# Patient Record
Sex: MALE | Race: WHITE | NOT HISPANIC OR LATINO | Employment: OTHER | URBAN - METROPOLITAN AREA
[De-identification: names, ages, dates, MRNs, and addresses within clinical notes are randomized per-mention and may not be internally consistent; named-entity substitution may affect disease eponyms.]

---

## 2017-01-12 ENCOUNTER — COMMUNICATION - HEALTHEAST (OUTPATIENT)
Dept: NEUROLOGY | Facility: CLINIC | Age: 67
End: 2017-01-12

## 2017-01-12 DIAGNOSIS — F41.1 ANXIETY STATE: ICD-10-CM

## 2017-02-10 ENCOUNTER — COMMUNICATION - HEALTHEAST (OUTPATIENT)
Dept: NEUROLOGY | Facility: CLINIC | Age: 67
End: 2017-02-10

## 2017-02-10 DIAGNOSIS — F32.A DEPRESSION: ICD-10-CM

## 2017-02-18 ENCOUNTER — AMBULATORY - HEALTHEAST (OUTPATIENT)
Dept: CARDIOLOGY | Facility: CLINIC | Age: 67
End: 2017-02-18

## 2017-02-27 ENCOUNTER — HOSPITAL ENCOUNTER (OUTPATIENT)
Dept: NEUROLOGY | Facility: CLINIC | Age: 67
Setting detail: THERAPIES SERIES
Discharge: STILL ADMITTED OR ADMINISTRATIVE | End: 2017-02-27
Attending: PSYCHIATRY & NEUROLOGY

## 2017-02-27 DIAGNOSIS — G31.83 LEWY BODY DEMENTIA WITHOUT BEHAVIORAL DISTURBANCE (H): ICD-10-CM

## 2017-02-27 DIAGNOSIS — F02.80 LEWY BODY DEMENTIA WITHOUT BEHAVIORAL DISTURBANCE (H): ICD-10-CM

## 2017-03-21 ENCOUNTER — COMMUNICATION - HEALTHEAST (OUTPATIENT)
Dept: NEUROLOGY | Facility: CLINIC | Age: 67
End: 2017-03-21

## 2017-05-15 ENCOUNTER — COMMUNICATION - HEALTHEAST (OUTPATIENT)
Dept: NEUROLOGY | Facility: CLINIC | Age: 67
End: 2017-05-15

## 2017-05-31 ENCOUNTER — COMMUNICATION - HEALTHEAST (OUTPATIENT)
Dept: NEUROLOGY | Facility: CLINIC | Age: 67
End: 2017-05-31

## 2017-05-31 DIAGNOSIS — F32.A DEPRESSION: ICD-10-CM

## 2017-07-10 ENCOUNTER — COMMUNICATION - HEALTHEAST (OUTPATIENT)
Dept: NEUROLOGY | Facility: CLINIC | Age: 67
End: 2017-07-10

## 2017-07-10 DIAGNOSIS — G31.84 MILD COGNITIVE IMPAIRMENT: ICD-10-CM

## 2017-08-03 ENCOUNTER — RECORDS - HEALTHEAST (OUTPATIENT)
Dept: LAB | Facility: CLINIC | Age: 67
End: 2017-08-03

## 2017-08-03 LAB
CHOLEST SERPL-MCNC: 236 MG/DL
FASTING STATUS PATIENT QL REPORTED: ABNORMAL
HDLC SERPL-MCNC: 28 MG/DL
LDLC SERPL CALC-MCNC: 104 MG/DL
LDLC SERPL CALC-MCNC: ABNORMAL MG/DL
TRIGL SERPL-MCNC: 506 MG/DL

## 2017-08-04 ENCOUNTER — COMMUNICATION - HEALTHEAST (OUTPATIENT)
Dept: NEUROLOGY | Facility: CLINIC | Age: 67
End: 2017-08-04

## 2017-08-04 DIAGNOSIS — F32.A DEPRESSION: ICD-10-CM

## 2017-08-07 ENCOUNTER — COMMUNICATION - HEALTHEAST (OUTPATIENT)
Dept: NEUROLOGY | Facility: CLINIC | Age: 67
End: 2017-08-07

## 2017-08-07 DIAGNOSIS — F41.1 ANXIETY STATE: ICD-10-CM

## 2017-08-30 ENCOUNTER — HOSPITAL ENCOUNTER (OUTPATIENT)
Dept: NEUROLOGY | Facility: CLINIC | Age: 67
Setting detail: THERAPIES SERIES
Discharge: STILL ADMITTED OR ADMINISTRATIVE | End: 2017-08-30
Attending: PSYCHIATRY & NEUROLOGY

## 2017-08-30 DIAGNOSIS — F02.80 LEWY BODY DEMENTIA (H): ICD-10-CM

## 2017-08-30 DIAGNOSIS — G31.83 LEWY BODY DEMENTIA (H): ICD-10-CM

## 2017-10-05 ENCOUNTER — HOSPITAL ENCOUNTER (OUTPATIENT)
Dept: OCCUPATIONAL THERAPY | Age: 67
Setting detail: THERAPIES SERIES
Discharge: STILL ADMITTED OR ADMINISTRATIVE | End: 2017-10-05
Attending: PSYCHIATRY & NEUROLOGY

## 2017-10-05 ENCOUNTER — HOSPITAL ENCOUNTER (OUTPATIENT)
Dept: NEUROLOGY | Facility: CLINIC | Age: 67
Setting detail: THERAPIES SERIES
Discharge: STILL ADMITTED OR ADMINISTRATIVE | End: 2017-10-05
Attending: PSYCHIATRY & NEUROLOGY

## 2017-10-05 DIAGNOSIS — G31.83 LEWY BODY DEMENTIA (H): ICD-10-CM

## 2017-10-05 DIAGNOSIS — F02.80 LEWY BODY DEMENTIA (H): ICD-10-CM

## 2017-10-05 DIAGNOSIS — R41.89 COGNITIVE IMPAIRMENT: ICD-10-CM

## 2017-10-09 ENCOUNTER — COMMUNICATION - HEALTHEAST (OUTPATIENT)
Dept: CARDIOLOGY | Facility: CLINIC | Age: 67
End: 2017-10-09

## 2017-11-11 ENCOUNTER — COMMUNICATION - HEALTHEAST (OUTPATIENT)
Dept: CARDIOLOGY | Facility: CLINIC | Age: 67
End: 2017-11-11

## 2017-11-11 DIAGNOSIS — R94.39 ABNORMAL NUCLEAR STRESS TEST: ICD-10-CM

## 2017-11-13 ENCOUNTER — COMMUNICATION - HEALTHEAST (OUTPATIENT)
Dept: CARDIOLOGY | Facility: CLINIC | Age: 67
End: 2017-11-13

## 2017-11-13 DIAGNOSIS — R94.39 ABNORMAL NUCLEAR STRESS TEST: ICD-10-CM

## 2017-12-14 ENCOUNTER — HOSPITAL ENCOUNTER (OUTPATIENT)
Dept: NEUROLOGY | Facility: CLINIC | Age: 67
Setting detail: THERAPIES SERIES
Discharge: STILL ADMITTED OR ADMINISTRATIVE | End: 2017-12-14
Attending: CLINICAL NEUROPSYCHOLOGIST

## 2017-12-14 DIAGNOSIS — G31.83 MILD NEUROCOGNITIVE DISORDER WITH LEWY BODIES (H): ICD-10-CM

## 2017-12-14 DIAGNOSIS — F02.A0 MILD NEUROCOGNITIVE DISORDER WITH LEWY BODIES (H): ICD-10-CM

## 2017-12-19 ENCOUNTER — COMMUNICATION - HEALTHEAST (OUTPATIENT)
Dept: ADMINISTRATIVE | Facility: CLINIC | Age: 67
End: 2017-12-19

## 2018-01-05 ENCOUNTER — HOSPITAL ENCOUNTER (OUTPATIENT)
Dept: NEUROLOGY | Facility: CLINIC | Age: 68
Setting detail: THERAPIES SERIES
Discharge: STILL ADMITTED OR ADMINISTRATIVE | End: 2018-01-05
Attending: PSYCHIATRY & NEUROLOGY

## 2018-01-05 DIAGNOSIS — G31.83 LEWY BODY DEMENTIA WITHOUT BEHAVIORAL DISTURBANCE (H): ICD-10-CM

## 2018-01-05 DIAGNOSIS — F02.80 LEWY BODY DEMENTIA WITHOUT BEHAVIORAL DISTURBANCE (H): ICD-10-CM

## 2018-01-05 DIAGNOSIS — G31.83 LEWY BODY DEMENTIA (H): ICD-10-CM

## 2018-01-05 DIAGNOSIS — F02.80 LEWY BODY DEMENTIA (H): ICD-10-CM

## 2018-01-19 ENCOUNTER — RECORDS - HEALTHEAST (OUTPATIENT)
Dept: LAB | Facility: CLINIC | Age: 68
End: 2018-01-19

## 2018-01-19 LAB
ALBUMIN SERPL-MCNC: 3.7 G/DL (ref 3.5–5)
ALP SERPL-CCNC: 97 U/L (ref 45–120)
ALT SERPL W P-5'-P-CCNC: 21 U/L (ref 0–45)
ANION GAP SERPL CALCULATED.3IONS-SCNC: 9 MMOL/L (ref 5–18)
AST SERPL W P-5'-P-CCNC: 31 U/L (ref 0–40)
BILIRUB SERPL-MCNC: 0.5 MG/DL (ref 0–1)
BUN SERPL-MCNC: 14 MG/DL (ref 8–22)
CALCIUM SERPL-MCNC: 9.4 MG/DL (ref 8.5–10.5)
CHLORIDE BLD-SCNC: 102 MMOL/L (ref 98–107)
CO2 SERPL-SCNC: 30 MMOL/L (ref 22–31)
CREAT SERPL-MCNC: 1.34 MG/DL (ref 0.7–1.3)
GFR SERPL CREATININE-BSD FRML MDRD: 53 ML/MIN/1.73M2
GLUCOSE BLD-MCNC: 107 MG/DL (ref 70–125)
POTASSIUM BLD-SCNC: 4.2 MMOL/L (ref 3.5–5)
PROT SERPL-MCNC: 7.4 G/DL (ref 6–8)
SODIUM SERPL-SCNC: 141 MMOL/L (ref 136–145)

## 2018-01-24 ENCOUNTER — COMMUNICATION - HEALTHEAST (OUTPATIENT)
Dept: NEUROLOGY | Facility: CLINIC | Age: 68
End: 2018-01-24

## 2018-01-24 DIAGNOSIS — G31.83 LEWY BODY DEMENTIA (H): ICD-10-CM

## 2018-01-24 DIAGNOSIS — F02.80 LEWY BODY DEMENTIA (H): ICD-10-CM

## 2018-02-12 ENCOUNTER — COMMUNICATION - HEALTHEAST (OUTPATIENT)
Dept: NEUROLOGY | Facility: CLINIC | Age: 68
End: 2018-02-12

## 2018-02-12 DIAGNOSIS — F32.A DEPRESSION: ICD-10-CM

## 2018-02-21 ENCOUNTER — AMBULATORY - HEALTHEAST (OUTPATIENT)
Dept: CARDIOLOGY | Facility: CLINIC | Age: 68
End: 2018-02-21

## 2018-02-21 ENCOUNTER — RECORDS - HEALTHEAST (OUTPATIENT)
Dept: ADMINISTRATIVE | Facility: OTHER | Age: 68
End: 2018-02-21

## 2018-02-26 ENCOUNTER — OFFICE VISIT - HEALTHEAST (OUTPATIENT)
Dept: CARDIOLOGY | Facility: CLINIC | Age: 68
End: 2018-02-26

## 2018-02-26 DIAGNOSIS — I25.5 ISCHEMIC CARDIOMYOPATHY: ICD-10-CM

## 2018-02-26 DIAGNOSIS — E78.2 MIXED HYPERLIPIDEMIA: ICD-10-CM

## 2018-02-26 DIAGNOSIS — I25.10 CORONARY ARTERY DISEASE INVOLVING NATIVE CORONARY ARTERY OF NATIVE HEART WITHOUT ANGINA PECTORIS: ICD-10-CM

## 2018-02-26 ASSESSMENT — MIFFLIN-ST. JEOR: SCORE: 1940.6

## 2018-03-13 ENCOUNTER — COMMUNICATION - HEALTHEAST (OUTPATIENT)
Dept: NEUROLOGY | Facility: CLINIC | Age: 68
End: 2018-03-13

## 2018-03-13 DIAGNOSIS — F32.A DEPRESSION: ICD-10-CM

## 2018-03-15 ENCOUNTER — COMMUNICATION - HEALTHEAST (OUTPATIENT)
Dept: NEUROLOGY | Facility: CLINIC | Age: 68
End: 2018-03-15

## 2018-03-15 DIAGNOSIS — G31.83 LEWY BODY DEMENTIA (H): ICD-10-CM

## 2018-03-15 DIAGNOSIS — F02.80 LEWY BODY DEMENTIA (H): ICD-10-CM

## 2018-04-03 ENCOUNTER — COMMUNICATION - HEALTHEAST (OUTPATIENT)
Dept: NEUROLOGY | Facility: CLINIC | Age: 68
End: 2018-04-03

## 2018-04-03 DIAGNOSIS — F02.80 LEWY BODY DEMENTIA (H): ICD-10-CM

## 2018-04-03 DIAGNOSIS — G31.83 LEWY BODY DEMENTIA (H): ICD-10-CM

## 2018-04-03 DIAGNOSIS — F29 PSYCHOSIS (H): ICD-10-CM

## 2018-06-15 ENCOUNTER — HOSPITAL ENCOUNTER (OUTPATIENT)
Dept: NEUROLOGY | Facility: CLINIC | Age: 68
Setting detail: THERAPIES SERIES
Discharge: STILL ADMITTED OR ADMINISTRATIVE | End: 2018-06-15
Attending: PSYCHIATRY & NEUROLOGY

## 2018-07-11 ENCOUNTER — RECORDS - HEALTHEAST (OUTPATIENT)
Dept: LAB | Facility: CLINIC | Age: 68
End: 2018-07-11

## 2018-07-11 LAB
ALBUMIN SERPL-MCNC: 3.5 G/DL (ref 3.5–5)
ALP SERPL-CCNC: 96 U/L (ref 45–120)
ALT SERPL W P-5'-P-CCNC: 22 U/L (ref 0–45)
ANION GAP SERPL CALCULATED.3IONS-SCNC: 10 MMOL/L (ref 5–18)
AST SERPL W P-5'-P-CCNC: 23 U/L (ref 0–40)
BILIRUB SERPL-MCNC: 0.5 MG/DL (ref 0–1)
BUN SERPL-MCNC: 13 MG/DL (ref 8–22)
CALCIUM SERPL-MCNC: 8.8 MG/DL (ref 8.5–10.5)
CHLORIDE BLD-SCNC: 105 MMOL/L (ref 98–107)
CO2 SERPL-SCNC: 27 MMOL/L (ref 22–31)
CREAT SERPL-MCNC: 1.12 MG/DL (ref 0.7–1.3)
GFR SERPL CREATININE-BSD FRML MDRD: >60 ML/MIN/1.73M2
GLUCOSE BLD-MCNC: 105 MG/DL (ref 70–125)
LIPASE SERPL-CCNC: 15 U/L (ref 0–52)
POTASSIUM BLD-SCNC: 4 MMOL/L (ref 3.5–5)
PROT SERPL-MCNC: 6.8 G/DL (ref 6–8)
SODIUM SERPL-SCNC: 142 MMOL/L (ref 136–145)

## 2018-08-15 ENCOUNTER — COMMUNICATION - HEALTHEAST (OUTPATIENT)
Dept: NEUROLOGY | Facility: CLINIC | Age: 68
End: 2018-08-15

## 2018-08-15 DIAGNOSIS — F32.A DEPRESSION: ICD-10-CM

## 2018-08-15 DIAGNOSIS — G31.83 LEWY BODY DEMENTIA (H): ICD-10-CM

## 2018-08-15 DIAGNOSIS — F02.80 LEWY BODY DEMENTIA (H): ICD-10-CM

## 2018-08-15 DIAGNOSIS — F29 PSYCHOSIS (H): ICD-10-CM

## 2018-09-25 ENCOUNTER — COMMUNICATION - HEALTHEAST (OUTPATIENT)
Dept: NEUROLOGY | Facility: CLINIC | Age: 68
End: 2018-09-25

## 2018-09-25 DIAGNOSIS — F32.A DEPRESSION: ICD-10-CM

## 2018-10-29 ENCOUNTER — COMMUNICATION - HEALTHEAST (OUTPATIENT)
Dept: NEUROLOGY | Facility: CLINIC | Age: 68
End: 2018-10-29

## 2018-11-02 ENCOUNTER — COMMUNICATION - HEALTHEAST (OUTPATIENT)
Dept: INTERNAL MEDICINE | Facility: CLINIC | Age: 68
End: 2018-11-02

## 2018-11-02 DIAGNOSIS — F03.91 MAJOR NEUROCOGNITIVE DISORDER WITH LEWY BODIES, PROBABLE, WITH BEHAVIORAL DISTURBANCE: ICD-10-CM

## 2018-11-08 ENCOUNTER — RECORDS - HEALTHEAST (OUTPATIENT)
Dept: LAB | Facility: CLINIC | Age: 68
End: 2018-11-08

## 2018-11-08 LAB — INR PPP: 1.91 (ref 0.9–1.1)

## 2018-11-16 ENCOUNTER — HOSPITAL ENCOUNTER (OUTPATIENT)
Dept: NEUROLOGY | Facility: CLINIC | Age: 68
Setting detail: THERAPIES SERIES
Discharge: STILL ADMITTED OR ADMINISTRATIVE | End: 2018-11-16
Attending: PSYCHOLOGIST

## 2018-11-16 DIAGNOSIS — F33.1 MDD (MAJOR DEPRESSIVE DISORDER), RECURRENT EPISODE, MODERATE (H): ICD-10-CM

## 2018-11-23 ENCOUNTER — HOSPITAL ENCOUNTER (OUTPATIENT)
Dept: NEUROLOGY | Facility: CLINIC | Age: 68
Setting detail: THERAPIES SERIES
Discharge: STILL ADMITTED OR ADMINISTRATIVE | End: 2018-11-23
Attending: PSYCHIATRY & NEUROLOGY

## 2018-11-28 ENCOUNTER — COMMUNICATION - HEALTHEAST (OUTPATIENT)
Dept: ADMINISTRATIVE | Facility: CLINIC | Age: 68
End: 2018-11-28

## 2018-12-07 ENCOUNTER — HOSPITAL ENCOUNTER (OUTPATIENT)
Dept: NEUROLOGY | Facility: CLINIC | Age: 68
Setting detail: THERAPIES SERIES
Discharge: STILL ADMITTED OR ADMINISTRATIVE | End: 2018-12-07
Attending: PSYCHOLOGIST

## 2018-12-07 DIAGNOSIS — F33.2 MDD (MAJOR DEPRESSIVE DISORDER), RECURRENT SEVERE, WITHOUT PSYCHOSIS (H): ICD-10-CM

## 2018-12-20 ENCOUNTER — COMMUNICATION - HEALTHEAST (OUTPATIENT)
Dept: NEUROLOGY | Facility: CLINIC | Age: 68
End: 2018-12-20

## 2018-12-20 DIAGNOSIS — F32.1 CURRENT MODERATE EPISODE OF MAJOR DEPRESSIVE DISORDER, UNSPECIFIED WHETHER RECURRENT (H): ICD-10-CM

## 2019-01-08 ENCOUNTER — COMMUNICATION - HEALTHEAST (OUTPATIENT)
Dept: NEUROLOGY | Facility: CLINIC | Age: 69
End: 2019-01-08

## 2019-01-08 DIAGNOSIS — G31.83 LEWY BODY DEMENTIA (H): ICD-10-CM

## 2019-01-08 DIAGNOSIS — F02.80 LEWY BODY DEMENTIA (H): ICD-10-CM

## 2019-01-16 ENCOUNTER — COMMUNICATION - HEALTHEAST (OUTPATIENT)
Dept: NEUROLOGY | Facility: CLINIC | Age: 69
End: 2019-01-16

## 2019-01-16 DIAGNOSIS — F32.0 CURRENT MILD EPISODE OF MAJOR DEPRESSIVE DISORDER WITHOUT PRIOR EPISODE (H): ICD-10-CM

## 2019-02-11 ENCOUNTER — COMMUNICATION - HEALTHEAST (OUTPATIENT)
Dept: NEUROLOGY | Facility: CLINIC | Age: 69
End: 2019-02-11

## 2019-02-11 DIAGNOSIS — F32.A DEPRESSION: ICD-10-CM

## 2019-02-13 ENCOUNTER — RECORDS - HEALTHEAST (OUTPATIENT)
Dept: LAB | Facility: CLINIC | Age: 69
End: 2019-02-13

## 2019-02-13 LAB
ALBUMIN SERPL-MCNC: 3.6 G/DL (ref 3.5–5)
ALP SERPL-CCNC: 111 U/L (ref 45–120)
ALT SERPL W P-5'-P-CCNC: <9 U/L (ref 0–45)
ANION GAP SERPL CALCULATED.3IONS-SCNC: 9 MMOL/L (ref 5–18)
AST SERPL W P-5'-P-CCNC: 19 U/L (ref 0–40)
BILIRUB SERPL-MCNC: 0.4 MG/DL (ref 0–1)
BUN SERPL-MCNC: 11 MG/DL (ref 8–22)
CALCIUM SERPL-MCNC: 8.9 MG/DL (ref 8.5–10.5)
CHLORIDE BLD-SCNC: 105 MMOL/L (ref 98–107)
CHOLEST SERPL-MCNC: 261 MG/DL
CO2 SERPL-SCNC: 27 MMOL/L (ref 22–31)
CREAT SERPL-MCNC: 1.1 MG/DL (ref 0.7–1.3)
FASTING STATUS PATIENT QL REPORTED: ABNORMAL
GFR SERPL CREATININE-BSD FRML MDRD: >60 ML/MIN/1.73M2
GLUCOSE BLD-MCNC: 102 MG/DL (ref 70–125)
HDLC SERPL-MCNC: 33 MG/DL
LDLC SERPL CALC-MCNC: 167 MG/DL
POTASSIUM BLD-SCNC: 4.4 MMOL/L (ref 3.5–5)
PROT SERPL-MCNC: 6.9 G/DL (ref 6–8)
PSA SERPL-MCNC: 0.3 NG/ML (ref 0–4.5)
SODIUM SERPL-SCNC: 141 MMOL/L (ref 136–145)
TRIGL SERPL-MCNC: 304 MG/DL

## 2019-03-07 ENCOUNTER — COMMUNICATION - HEALTHEAST (OUTPATIENT)
Dept: NEUROLOGY | Facility: CLINIC | Age: 69
End: 2019-03-07

## 2019-04-23 ENCOUNTER — OFFICE VISIT - HEALTHEAST (OUTPATIENT)
Dept: CARDIOLOGY | Facility: CLINIC | Age: 69
End: 2019-04-23

## 2019-04-23 DIAGNOSIS — E78.2 MIXED HYPERLIPIDEMIA: ICD-10-CM

## 2019-04-23 DIAGNOSIS — I25.5 ISCHEMIC CARDIOMYOPATHY: ICD-10-CM

## 2019-04-23 DIAGNOSIS — I25.10 CORONARY ARTERY DISEASE INVOLVING NATIVE CORONARY ARTERY OF NATIVE HEART WITHOUT ANGINA PECTORIS: ICD-10-CM

## 2019-04-23 ASSESSMENT — MIFFLIN-ST. JEOR: SCORE: 1926.99

## 2019-05-06 ENCOUNTER — COMMUNICATION - HEALTHEAST (OUTPATIENT)
Dept: NEUROLOGY | Facility: CLINIC | Age: 69
End: 2019-05-06

## 2019-06-12 ENCOUNTER — HOSPITAL ENCOUNTER (OUTPATIENT)
Dept: NEUROLOGY | Facility: CLINIC | Age: 69
Setting detail: THERAPIES SERIES
Discharge: STILL ADMITTED OR ADMINISTRATIVE | End: 2019-06-12
Attending: PSYCHIATRY & NEUROLOGY

## 2019-06-12 DIAGNOSIS — G31.83 LEWY BODY DEMENTIA WITHOUT BEHAVIORAL DISTURBANCE (H): ICD-10-CM

## 2019-06-12 DIAGNOSIS — F02.80 LEWY BODY DEMENTIA WITHOUT BEHAVIORAL DISTURBANCE (H): ICD-10-CM

## 2019-07-01 ENCOUNTER — COMMUNICATION - HEALTHEAST (OUTPATIENT)
Dept: NEUROLOGY | Facility: CLINIC | Age: 69
End: 2019-07-01

## 2019-07-01 DIAGNOSIS — F29 PSYCHOSIS (H): ICD-10-CM

## 2019-07-30 ENCOUNTER — RECORDS - HEALTHEAST (OUTPATIENT)
Dept: LAB | Facility: CLINIC | Age: 69
End: 2019-07-30

## 2019-07-31 LAB — BACTERIA SPEC CULT: NO GROWTH

## 2019-08-19 ENCOUNTER — COMMUNICATION - HEALTHEAST (OUTPATIENT)
Dept: NEUROLOGY | Facility: CLINIC | Age: 69
End: 2019-08-19

## 2019-08-19 DIAGNOSIS — F32.A DEPRESSION: ICD-10-CM

## 2019-08-19 DIAGNOSIS — F02.80 LEWY BODY DEMENTIA (H): ICD-10-CM

## 2019-08-19 DIAGNOSIS — G31.83 LEWY BODY DEMENTIA (H): ICD-10-CM

## 2019-09-03 ENCOUNTER — COMMUNICATION - HEALTHEAST (OUTPATIENT)
Dept: NEUROLOGY | Facility: CLINIC | Age: 69
End: 2019-09-03

## 2019-09-03 DIAGNOSIS — F32.A DEPRESSION: ICD-10-CM

## 2019-09-04 ENCOUNTER — COMMUNICATION - HEALTHEAST (OUTPATIENT)
Dept: NEUROLOGY | Facility: CLINIC | Age: 69
End: 2019-09-04

## 2019-09-04 DIAGNOSIS — F32.A DEPRESSION: ICD-10-CM

## 2019-11-06 ENCOUNTER — HOSPITAL ENCOUNTER (OUTPATIENT)
Dept: NEUROLOGY | Facility: CLINIC | Age: 69
Setting detail: THERAPIES SERIES
Discharge: STILL ADMITTED OR ADMINISTRATIVE | End: 2019-11-06
Attending: PSYCHIATRY & NEUROLOGY

## 2019-11-06 DIAGNOSIS — F02.80 LEWY BODY DEMENTIA WITHOUT BEHAVIORAL DISTURBANCE (H): ICD-10-CM

## 2019-11-06 DIAGNOSIS — G31.83 LEWY BODY DEMENTIA WITHOUT BEHAVIORAL DISTURBANCE (H): ICD-10-CM

## 2019-11-25 ENCOUNTER — COMMUNICATION - HEALTHEAST (OUTPATIENT)
Dept: NEUROLOGY | Facility: CLINIC | Age: 69
End: 2019-11-25

## 2019-11-25 DIAGNOSIS — F32.A DEPRESSION: ICD-10-CM

## 2020-01-23 ENCOUNTER — RECORDS - HEALTHEAST (OUTPATIENT)
Dept: LAB | Facility: CLINIC | Age: 70
End: 2020-01-23

## 2020-01-23 LAB
ALBUMIN SERPL-MCNC: 3.6 G/DL (ref 3.5–5)
ALP SERPL-CCNC: 93 U/L (ref 45–120)
ALT SERPL W P-5'-P-CCNC: 12 U/L (ref 0–45)
ANION GAP SERPL CALCULATED.3IONS-SCNC: 8 MMOL/L (ref 5–18)
AST SERPL W P-5'-P-CCNC: 20 U/L (ref 0–40)
BILIRUB SERPL-MCNC: 0.4 MG/DL (ref 0–1)
BUN SERPL-MCNC: 14 MG/DL (ref 8–22)
CALCIUM SERPL-MCNC: 8.9 MG/DL (ref 8.5–10.5)
CHLORIDE BLD-SCNC: 103 MMOL/L (ref 98–107)
CHOLEST SERPL-MCNC: 262 MG/DL
CO2 SERPL-SCNC: 30 MMOL/L (ref 22–31)
CREAT SERPL-MCNC: 1 MG/DL (ref 0.7–1.3)
FASTING STATUS PATIENT QL REPORTED: ABNORMAL
GFR SERPL CREATININE-BSD FRML MDRD: >60 ML/MIN/1.73M2
GLUCOSE BLD-MCNC: 108 MG/DL (ref 70–125)
HDLC SERPL-MCNC: 33 MG/DL
LDLC SERPL CALC-MCNC: 118 MG/DL
LDLC SERPL CALC-MCNC: ABNORMAL MG/DL
POTASSIUM BLD-SCNC: 3.8 MMOL/L (ref 3.5–5)
PROT SERPL-MCNC: 6.6 G/DL (ref 6–8)
SODIUM SERPL-SCNC: 141 MMOL/L (ref 136–145)
TRIGL SERPL-MCNC: 510 MG/DL

## 2020-03-26 ENCOUNTER — COMMUNICATION - HEALTHEAST (OUTPATIENT)
Dept: CARDIOLOGY | Facility: CLINIC | Age: 70
End: 2020-03-26

## 2020-03-27 ENCOUNTER — AMBULATORY - HEALTHEAST (OUTPATIENT)
Dept: CARDIOLOGY | Facility: CLINIC | Age: 70
End: 2020-03-27

## 2020-03-27 ENCOUNTER — RECORDS - HEALTHEAST (OUTPATIENT)
Dept: ADMINISTRATIVE | Facility: OTHER | Age: 70
End: 2020-03-27

## 2020-03-31 ENCOUNTER — RECORDS - HEALTHEAST (OUTPATIENT)
Dept: ADMINISTRATIVE | Facility: OTHER | Age: 70
End: 2020-03-31

## 2020-03-31 ENCOUNTER — AMBULATORY - HEALTHEAST (OUTPATIENT)
Dept: CARDIOLOGY | Facility: CLINIC | Age: 70
End: 2020-03-31

## 2020-04-01 ENCOUNTER — COMMUNICATION - HEALTHEAST (OUTPATIENT)
Dept: CARDIOLOGY | Facility: CLINIC | Age: 70
End: 2020-04-01

## 2020-04-01 ENCOUNTER — OFFICE VISIT - HEALTHEAST (OUTPATIENT)
Dept: CARDIOLOGY | Facility: CLINIC | Age: 70
End: 2020-04-01

## 2020-04-01 DIAGNOSIS — I49.3 PVC'S (PREMATURE VENTRICULAR CONTRACTIONS): ICD-10-CM

## 2020-04-01 DIAGNOSIS — I50.23 ACUTE ON CHRONIC SYSTOLIC HEART FAILURE (H): ICD-10-CM

## 2020-04-03 ENCOUNTER — HOSPITAL ENCOUNTER (OUTPATIENT)
Dept: CARDIOLOGY | Facility: HOSPITAL | Age: 70
Discharge: HOME | End: 2020-04-03
Attending: INTERNAL MEDICINE

## 2020-04-03 LAB
AORTIC VALVE MEAN VELOCITY: 84.5 CM/S
ASCENDING AORTA: 4.2 CM
AV DIMENSIONLESS INDEX VTI: 0.7
AV MEAN GRADIENT: 3 MMHG
AV PEAK GRADIENT: 4.8 MMHG
AV VALVE AREA: 2.5 CM2
AV VELOCITY RATIO: 0.8
BSA FOR ECHO PROCEDURE: 2.38 M2
CV ECHO HEIGHT: 74 IN
CV ECHO WEIGHT: 240 LBS
DOP CALC AO PEAK VEL: 110 CM/S
DOP CALC AO VTI: 21.8 CM
DOP CALC LVOT AREA: 3.46 CM2
DOP CALC LVOT DIAMETER: 2.1 CM
DOP CALC LVOT PEAK VEL: 82.5 CM/S
DOP CALC LVOT STROKE VOLUME: 55.4 CM3
DOP CALCLVOT PEAK VEL VTI: 16 CM
EJECTION FRACTION: 46 % (ref 55–75)
LEFT ATRIUM SIZE: 4 CM
LEFT VENTRICLE DIASTOLIC VOLUME INDEX: 67.2 CM3/M2 (ref 34–74)
LEFT VENTRICLE DIASTOLIC VOLUME: 160 CM3 (ref 62–150)
LEFT VENTRICLE SYSTOLIC VOLUME INDEX: 36.1 CM3/M2 (ref 11–31)
LEFT VENTRICLE SYSTOLIC VOLUME: 86 CM3 (ref 21–61)
LEFT VENTRICULAR OUTFLOW TRACT MEAN GRADIENT: 2 MMHG
LEFT VENTRICULAR OUTFLOW TRACT MEAN VELOCITY: 64.7 CM/S
LEFT VENTRICULAR OUTFLOW TRACT PEAK GRADIENT: 3 MMHG
LV STROKE VOLUME INDEX: 23.3 ML/M2
MITRAL VALVE E/A RATIO: 0.6
MV AVERAGE E/E' RATIO: 7.5 CM/S
MV DECELERATION TIME: 204 MS
MV E'TISSUE VEL-LAT: 7.03 CM/S
MV E'TISSUE VEL-MED: 7.88 CM/S
MV LATERAL E/E' RATIO: 8
MV MEDIAL E/E' RATIO: 7.1
MV PEAK A VELOCITY: 87.2 CM/S
MV PEAK E VELOCITY: 56.1 CM/S
NUC REST DIASTOLIC VOLUME INDEX: 3840 LBS
NUC REST SYSTOLIC VOLUME INDEX: 74 IN

## 2020-04-03 ASSESSMENT — MIFFLIN-ST. JEOR: SCORE: 1913.38

## 2020-04-08 ENCOUNTER — COMMUNICATION - HEALTHEAST (OUTPATIENT)
Dept: CARDIOLOGY | Facility: CLINIC | Age: 70
End: 2020-04-08

## 2020-04-08 ENCOUNTER — OFFICE VISIT - HEALTHEAST (OUTPATIENT)
Dept: CARDIOLOGY | Facility: CLINIC | Age: 70
End: 2020-04-08

## 2020-04-08 DIAGNOSIS — I49.3 PVC'S (PREMATURE VENTRICULAR CONTRACTIONS): ICD-10-CM

## 2020-04-08 DIAGNOSIS — I50.23 ACUTE ON CHRONIC SYSTOLIC HEART FAILURE (H): ICD-10-CM

## 2020-04-10 ENCOUNTER — COMMUNICATION - HEALTHEAST (OUTPATIENT)
Dept: CARDIOLOGY | Facility: CLINIC | Age: 70
End: 2020-04-10

## 2020-04-13 ENCOUNTER — COMMUNICATION - HEALTHEAST (OUTPATIENT)
Dept: CARDIOLOGY | Facility: CLINIC | Age: 70
End: 2020-04-13

## 2020-04-16 ENCOUNTER — OFFICE VISIT - HEALTHEAST (OUTPATIENT)
Dept: CARDIOLOGY | Facility: CLINIC | Age: 70
End: 2020-04-16

## 2020-04-16 DIAGNOSIS — I10 ESSENTIAL HYPERTENSION: ICD-10-CM

## 2020-04-16 DIAGNOSIS — I50.23 ACUTE ON CHRONIC SYSTOLIC HEART FAILURE (H): ICD-10-CM

## 2020-04-16 DIAGNOSIS — I25.5 ISCHEMIC CARDIOMYOPATHY: ICD-10-CM

## 2020-04-16 LAB
ANION GAP SERPL CALCULATED.3IONS-SCNC: 10 MMOL/L (ref 5–18)
BUN SERPL-MCNC: 11 MG/DL (ref 8–28)
CALCIUM SERPL-MCNC: 8.7 MG/DL (ref 8.5–10.5)
CHLORIDE BLD-SCNC: 99 MMOL/L (ref 98–107)
CO2 SERPL-SCNC: 29 MMOL/L (ref 22–31)
CREAT SERPL-MCNC: 1.04 MG/DL (ref 0.7–1.3)
GFR SERPL CREATININE-BSD FRML MDRD: >60 ML/MIN/1.73M2
GLUCOSE BLD-MCNC: 105 MG/DL (ref 70–125)
POTASSIUM BLD-SCNC: 4.2 MMOL/L (ref 3.5–5)
SODIUM SERPL-SCNC: 138 MMOL/L (ref 136–145)

## 2020-04-16 ASSESSMENT — MIFFLIN-ST. JEOR: SCORE: 1949.67

## 2020-04-17 LAB — BNP SERPL-MCNC: 10 PG/ML (ref 0–67)

## 2020-04-23 ENCOUNTER — OFFICE VISIT - HEALTHEAST (OUTPATIENT)
Dept: CARDIOLOGY | Facility: CLINIC | Age: 70
End: 2020-04-23

## 2020-04-23 DIAGNOSIS — I25.5 ISCHEMIC CARDIOMYOPATHY: ICD-10-CM

## 2020-04-23 DIAGNOSIS — I25.10 CORONARY ARTERY DISEASE INVOLVING NATIVE CORONARY ARTERY OF NATIVE HEART WITHOUT ANGINA PECTORIS: ICD-10-CM

## 2020-04-23 DIAGNOSIS — I50.23 ACUTE ON CHRONIC SYSTOLIC HEART FAILURE (H): ICD-10-CM

## 2020-04-23 DIAGNOSIS — I50.22 CHRONIC SYSTOLIC HEART FAILURE (H): ICD-10-CM

## 2020-07-19 ENCOUNTER — COMMUNICATION - HEALTHEAST (OUTPATIENT)
Dept: CARDIOLOGY | Facility: CLINIC | Age: 70
End: 2020-07-19

## 2020-07-19 DIAGNOSIS — I50.23 ACUTE ON CHRONIC SYSTOLIC HEART FAILURE (H): ICD-10-CM

## 2020-08-25 ENCOUNTER — VIRTUAL VISIT (OUTPATIENT)
Dept: PHARMACY | Facility: PHYSICIAN GROUP | Age: 70
End: 2020-08-25
Payer: COMMERCIAL

## 2020-08-25 DIAGNOSIS — G89.29 OTHER CHRONIC PAIN: ICD-10-CM

## 2020-08-25 DIAGNOSIS — F02.818 LEWY BODY DEMENTIA WITH BEHAVIORAL DISTURBANCE (H): ICD-10-CM

## 2020-08-25 DIAGNOSIS — I10 BENIGN ESSENTIAL HYPERTENSION: ICD-10-CM

## 2020-08-25 DIAGNOSIS — Z78.9 TAKES DIETARY SUPPLEMENTS: ICD-10-CM

## 2020-08-25 DIAGNOSIS — G62.9 PERIPHERAL POLYNEUROPATHY: ICD-10-CM

## 2020-08-25 DIAGNOSIS — M62.838 MUSCLE SPASM: ICD-10-CM

## 2020-08-25 DIAGNOSIS — F41.9 ANXIETY: ICD-10-CM

## 2020-08-25 DIAGNOSIS — Z86.711 HISTORY OF PULMONARY EMBOLISM: Primary | ICD-10-CM

## 2020-08-25 DIAGNOSIS — F32.A DEPRESSION, UNSPECIFIED DEPRESSION TYPE: ICD-10-CM

## 2020-08-25 DIAGNOSIS — G47.00 INSOMNIA, UNSPECIFIED TYPE: ICD-10-CM

## 2020-08-25 DIAGNOSIS — N40.0 BENIGN PROSTATIC HYPERPLASIA, UNSPECIFIED WHETHER LOWER URINARY TRACT SYMPTOMS PRESENT: ICD-10-CM

## 2020-08-25 DIAGNOSIS — R23.8 SKIN IRRITATION: ICD-10-CM

## 2020-08-25 DIAGNOSIS — K21.9 GASTROESOPHAGEAL REFLUX DISEASE WITHOUT ESOPHAGITIS: ICD-10-CM

## 2020-08-25 DIAGNOSIS — R11.0 NAUSEA: ICD-10-CM

## 2020-08-25 DIAGNOSIS — G50.0 TRIGEMINAL NEURALGIA: ICD-10-CM

## 2020-08-25 DIAGNOSIS — G31.83 LEWY BODY DEMENTIA WITH BEHAVIORAL DISTURBANCE (H): ICD-10-CM

## 2020-08-25 PROCEDURE — 99605 MTMS BY PHARM NP 15 MIN: CPT | Mod: TEL | Performed by: PHARMACIST

## 2020-08-25 PROCEDURE — 99607 MTMS BY PHARM ADDL 15 MIN: CPT | Mod: TEL | Performed by: PHARMACIST

## 2020-08-25 RX ORDER — QUETIAPINE FUMARATE 25 MG/1
50 TABLET, FILM COATED ORAL AT BEDTIME
COMMUNITY

## 2020-08-25 RX ORDER — WARFARIN SODIUM 4 MG/1
4 TABLET ORAL DAILY
COMMUNITY

## 2020-08-25 RX ORDER — TAMSULOSIN HYDROCHLORIDE 0.4 MG/1
0.8 CAPSULE ORAL DAILY
COMMUNITY

## 2020-08-25 RX ORDER — FUROSEMIDE 20 MG
40 TABLET ORAL DAILY
COMMUNITY

## 2020-08-25 RX ORDER — ONDANSETRON 8 MG/1
8 TABLET, ORALLY DISINTEGRATING ORAL EVERY 8 HOURS PRN
COMMUNITY

## 2020-08-25 RX ORDER — FINASTERIDE 5 MG/1
5 TABLET, FILM COATED ORAL EVERY MORNING
COMMUNITY

## 2020-08-25 RX ORDER — RIVASTIGMINE 9.5 MG/24H
1 PATCH, EXTENDED RELEASE TRANSDERMAL DAILY
COMMUNITY
End: 2021-04-07

## 2020-08-25 RX ORDER — ESCITALOPRAM OXALATE 20 MG/1
20 TABLET ORAL DAILY
COMMUNITY

## 2020-08-25 NOTE — PROGRESS NOTES
[facility] ENCOUNTER  SUBJECTIVE/OBJECTIVE:                           Cain Charles is a 70 year old male called for an initial visit.  He was referred to me from Dr. Sharp.     Patient consented to a telehealth visit: yes  Telemedicine Visit Details  Type of service:  Telephone visit  Start Time: 10:01 AM  End Time: 11:07 AM  Originating Location (pt. Location): Home  Distant Location (provider location):  [facility]  Mode of Communication:  Telephone    Chief Complaint: Initial medication review.  Referral for polypharmacy, with the goal to reduce medications. He is wondering if he is taking his medications are the right time of day.  He feels like he is taking a lot of medications, and thinks one of his AM medications is causing some GI upset.  Patients main concerns are the hot flashes, anxiety, and uncontrolled pain.    Personal Healthcare Goals: Patient would like to improve some of the medication side effects he is having, and to reduce medications.      Allergies/ADRs: Compazine - anxiety attack, sulfamethizole, galantamine - tremors, atorvastatin - muscle cramps, simvastatin - muscle cramps, Lyrica - akinesia, increased anxiety, trazodone - nightmares, Cipro   Tobacco:  reports that he has never smoked. He does not have any smokeless tobacco history on file.  Alcohol: not currently using  Caffeine: no caffeine  PMH: Reviewed in EHR    Medication Adherence/Access:  He is having problem with his pharmacy and filling his medications.  The Tizanidine the bottle says 1-2 tablets every 8 hours as needed (60 pills).  Pt is using 4-6 per day, and says it isn't lasting him a full 30 days.  Pt would like the quantity for the prescription to be increased.    Hiw wife sets up his medications for him.  He takes is AM medications around 10 am, usually sleeps in a bit.  PM medications are usually around 3-4 PM, and then his bedtime is around midnight.  Takes HS medications around 11:30 or 11:00  PM    Hx PE/HTN/ Hx anterior MI/ Stenting of LAD:   Patient is currently taking warfarin 4 mg daily  aspirin 81mg for anticoagulation and antiplatelet therapy. Patient reports no current concerns of bruising or bleeding. Patient does not have a hx of GI bleed.   Patient is also taking Losartan 100 mg daily, furosemide 40 mg daily, and Atenolol 50 mg daily. Pt reports no current medication side effects. Patient does not self-monitor BP.  [facility]    INR:  7/29 - 2.7  6/30 - 2.3  5/29 - 1.7  5/1 - 1/8 4/1/2020:  LVEF 45%    ECHO(3/6/17)  QT/QTc - 400/408    Lewy Body Dementia:  Patient currently taking Exelon 9.5 mg patch once daily.  Over the years his memory has gotton worse.  He notes that his short term memory is poor.  Patient having horrific nightmares at night.  These started happening in the last month or two.  He usually sleeps for 4 hours and then wakes up due to the dreams.  This is occurring every night.    Chronic pain/Trigeminal Neuralgia/Peripheral Neuropathy/Muscle spasms:    Current medications include: Hydrocodone-APAP 7.5 mg 1.5 tablets three times daily, Gabapentin 600 mg AM/1200 mg PM, Tizanidine 4-8 mg three times daily (taking scheduled).     He has been slowly reducing down his dose of his hydrocodone over the last couples years and his gabapentin dose.  He has been in a lot of pain lately, mostly in his back and legs.  It has been 3-4 months ago since his last dose decrease.  His muscle spasms at night have gotten terrible, and occur mostly at night.  The nerve pain in his feet at night are terrible as well.  He is noticing more nerve pain in his feet.  Sometimes it is so intense he cannot sleep.  Has been on cyclobenazprine and it wasn't effective.     Patient is taking medical marijuana.  He is thinking of backing off on it due to the cost.  Pt denies constipation, if anything has diarrhea.  Has a lot of anxiety which he thinks affects his  bowels.  He gets enormous hot flashes.  Does get some sedation/dizzines.     Patient reports the following side effects:  Confusion, Depression, Lethargy, Memory Loss (also has lewy body dementia), Nausea, Sedation, severe sedation/fatigue, hot flashes, balance issues, and falls.  Hot flashes/sweating which started about 3-4 months ago.  Neuropathy also contributes to balance issues and falls.  MME:  33.75    Nausea:  Currently taking Ondansetron 8 mg ODT three times PRN.  Pt denies side effects and it is effective when needed.  The nausea is hard to nail down what causes it, doesn't notice it at specific times.  May go days without the nausea, and then it is there for a few days.      Depression/Anxiety/Insomnia:    Current medications include: Escitalopram 20 mg once daily, Mirtazapine 30 mg daily, Quetiapine 25 mg three times daily/ 50 mg HS, and Alprazolam 1 mg three times daily (takes two tabs every day and 3 tabs a few days per week).    Pt is also having horrific nightmares, these dreams wake him up at night.  These started happening again in the last month or two.  Usually will sleep for 4 hours, and then wakes up due to dreams.  Pt thinks he has been feeling down and more anxious lately.  He does have a lot of anxiety.  Pt has tried melatonin and it isn't effective, not using it. He also has never used the hydroxyzine on his medication list.   Patient was started on quetiapine by his previous psychiatrist Dr. Earl.  It was started after patient's good friend committed suicide, and the pt helped clean up the persons home which was very distressing for him to see.  He was started on quetiapine and it was helpful for anxiety.  Hx of previous medications:  - Venlafaxine:  Stopped due to diarrhea, worsening in muscle cramps  - Fluoxetine:  Pt's response is unclear.  - Bupropion:  Pt's response is unclear.  - Paroxetine: Pt's response is unclear.    GERD:   Current medications include: Protonix (pantoprazole) 40  mg once daily. Pt c/o no current symptoms.  Patient feels that current regimen is effective.    BPH:   Currently taking Finasteride 5 mg daily, Tamsulosin 0.8 mg daily.  He does sometimes have issues emptying bladder, but has improved a lot since starting these medications.  Going to the bathroom about 2 times at night.  He was getting up at night a lot more often.  He is up every 20 minutes or so after taking BPH medications during the day.      Skin irritation:  Patient uses betamethasone--clotrimazole 1-0.05 cream BID PRN, and Nystatin-triamcinolone ointment three times daily PRN.  Both topicals are effective when needed.    Supplements/OTCS:  Patient currently taking vitamin D 2000 units daily,     Today's Vitals: There were no vitals taken for this visit.  - telephone encounter.    ASSESSMENT:                              Medication Adherence: good, no issues identified    Significant Drug interactions:  Tizanidine w/ Atenolol, Furosemide, Losartan, Nitrostat, Tamsulosin:  Increased risk of hypotension, orthostasis, and falls.  Alprazolam/ Hydrocodone/ Tizanidine/ Gabapentin/ Quetiapine:  Increased risk of CNS depression.  Escitalopram/ Tizanidine/ Quetiapine:  Increased risk of QT prolongation.  Last QT within normal limits, and no major dose changes since then.    Hx PE/HTN/ Hx anterior MI/ Stenting of LAD:    Stable. Patient is meeting BP goal of < 140/90mmHg.     Lewy Body Dementia:  Stable.  Patients hot flashes and nightmares could be somatic symptoms of Lewy body dementia.  They could also be medication side effects of escitalopram, hydrocodone, Exelon patch.    Chronic pain/Trigeminal Neuralgia/Peripheral Neuropathy/Muscle spasms:    Needs improvement.  Patient is still having a lot of neuropathy and muscle spasms.  Tizinidine can cause hypotension/dizziness, and sedation. Patient's sedation is likely a combination of medication side effects from hydrocodone, tizanidine, quetiapine, gabapentin, and  alprazolam.  He has greatly reduced doses of his pain meds.  May be beneficial to try switching to a less sedating muscle relaxer like Methocarbamol.  Pt taking close to the max dose of tizanidine every day, so would suggest switching to methocarbamol 750 mg three times daily to start with.  Patient is at a higher risk of CNS depression, respiratory failure, and overdose with concomitant use of hydrocodone (33.75 MME), alprazolam, tizanidine, and gabapentin.  Given short term memory issues, there could be concern for medication mistakes and accidental overdoses.  Patient may benefit from having Narcan available as safety precaution.      Nausea:  Stable.  Nausea could be a side effect of his hydrocodone, but also sounds like it is linked to anxiety. Patient's GI upset in the morning could be caused by several of his medications.  Most likely would be vitamin D and aspirin, which he could try taking at bedtime to see if it's less noticeable.    Depression/Anxiety/Insomnia:    Needs improvement.  Patient is having significant anxiety, depression and issues sleeping.  Much of his insomnia is from the vivid dreams and muscle cramps waking him up.  His pain is also not well controlled and interferes with sleep.  Patient would likely benefit from switching from escitalopram to an SNRI like duloxetine, venlafaxine, or Fetzima.  An SNRI may help with his chronic pain and neuropathy in his feet.  Would suggest trying Fetzima first if it's covered because it more potently increases norepinephrine vs serotonin (3:1 ratio).  Patient vivid dreams could be a side effect of several medications, but also could be related to douglas body dementia.  Possible meds: mirtazapine, alprazolam, quetiapine, escitalopram.  Alprazolam is a high risk medication, especially given this patients history of falls, memory issues, and dementia. Would likely be beneficial for him to slowly taper down the dose over a period of a 1-3 years.  Could reduce  dose by 0.25 mg every 2-3 months as tolerated.  Would be best to first focus on his main concerns of pain and the hot flashes before changing.  Would not recommend the patient use hydroxyzine due to risk of exacerbating dementia, falls, balance issues, memory problems, and confusion.    GERD:   Stable.    BPH:   Stable. His tamsulosin could be contributing to dizziness and orthostasis.  We could consider switching to alfuzosin which is much more selective for alpha-1 receptor vs tamsulosin, and is less likely to cause dizziness.    Skin irritation:  Stable.    Supplements/OTCS:  Stable.    PLAN:                            1. Consider stopping tizanidine and switching to methocarbamol 750 mg three times daily.    2. Consider switching Vitamin D and Aspirin at night to see if the upset stomach improves.  If no improvement after 2 weeks switch Vitamin D back to morning.      3.  Recommend consider tapering off escitalopram, and switching to Fetzima.    I spent 60 minutes with this patient today. I offer these suggestions for consideration by Dr. Sharp. A copy of the visit note was provided to the patient's primary care provider.    Will follow up in 1 week.    The patient was mailed a summary of these recommendations.     Dena Hewitt, PharmD  Medication Therapy Management Pharmacist  Pager: [phone]

## 2020-08-25 NOTE — PROGRESS NOTES
MTM ENCOUNTER  SUBJECTIVE/OBJECTIVE:                           Cain Charles is a 70 year old male called for an initial visit. He was referred to me from Dr. Sharp.    Medications   Name strength formulation, Sig: take route frequency   Taking ATENOLOL 50 mg tablet, Si tab(s) orally once a day    Taking Lorcet Plus 325 mg-7.5 mg tablet, Si tab(s) orally every 4 - 6 hours, maximum 5 in 24 hours Start Date: 2019    Taking Vitamin D3 2000 iu , Si tab once a day    Taking Tizanidine Hydrochloride 4 mg Tablet, Si-2 tab(s) orally every 8 hours as needed for muslce spasm    Taking tamsulosin 0.4 mg Capsule, Si CAPSULES ONCE A DAY AT BEDTIME ORALLY     Refill Acetaminophen-Hydrocodone Bitartrate 325 mg-7.5 mg tablet, Si and 1/2 tabs orally 3 times daily Start Date: 2020 Stop Date: 2020    Taking Seroquel 50 mg tablet, Si tab(s) orally once daily at bedtime    Taking Remeron 30 mg tablet, Si tab(s) orally once a day (at bedtime)    Taking pantoprazole 40 mg Delayed Release Tablet, Si tab(s) orally once a day    Taking Ondansetron Hydrochloride 8 mg tablet, disintegrating, Si tab(s) orally 3 times a day as needed for nausea Start Date: 2020 Stop Date: 2021    Taking Nystatin-Triamcinolone 444917 units/g-0.1% ointment, Si pooja applied topically 3 times a day Start Date: 2019    Taking Nitrostat 0.4 mg Tablet, Si tab(s) sublingually every 5 minutes up to 3 times as needed for chest pain/pressure    Taking melatonin 3 mg tablet, Si tab(s) orally at bedtime as needed    Taking HydrOXYzine Pamoate pamoate 25 mg capsule, Si-2 cap(s) orally 4 times a day if needed Start Date: 2018    Taking gabapentin 600 mg tablet, Si tab AM, 2 tabs PM orally twice daily    Taking finasteride 5 mg tablet, Si tab(s) orally once a day in the morning    Taking Exelon 9.4 mg/24 hr film, extended release, Si patch applied topically once a  "day    Taking Betamethasone-Clotrimazole 0.05 cream, Si pooja applied topically 2 times a day as needed    Taking aspirin 81 mg delayed release tablet, Si tab(s) orally once a day    Refill alprazolam 1 mg Tablet, Si tab orally up to 3 times daily as needed    Continue warfarin 4 mg tablet, Sig: orally once a day in the evening    Taking Escitalopram Oxalate 20 mg tablet, Si.5 tabs orally once a day    Taking Seroquel 25 mg tablet, Si tab(s) orally 3 times a day    Taking Losartan Potassium 100 mg tablet, Si tab(s) orally once a day Start Date: 2020 Stop Date: 2021    Taking furosemide 20 mg Tablet, Si-2 tab(s) orally once daily          Patient consented to a telehealth visit: {YES(DEFAULT)/NO/MULT:227873::\"yes\"}  Telemedicine Visit Details  Type of service:  {telemedvisitmtm:211367::\"Telephone visit\"}  Start Time: {video/phone visit start time:1529}  End Time: {video/phone visit end time:1529}  Originating Location (pt. Location): Home  Distant Location (provider location):  [facility]  Mode of Communication:  {telemedmtm2:525991::\"Telephone\"}    Chief Complaint: Initial medication review.  Referral for polypharmacy, with the goal to reduce medications.  Personal Healthcare Goals: ***    Allergies/ADRs: Compazine - anxiety attack, sulfamethizole, galantamine - tremors, atorvastatin - muscle cramps, simvastatin - muscle cramps, Lyrica - akinesia, increased anxiety, trazodone - nightmares, Cipro     Tobacco:  reports that he has never smoked. He does not have any smokeless tobacco history on file.{Tobacco Cessation needed for ACO -- Delete if patient is a non-smoker:246997}  Alcohol: {ALCOHOL CONSUMPTION HX:883092}  Caffeine: {CAFFEINE INTAKE:725304}  Activity: ***  PMH: {/3//5:188009}    Medication Adherence/Access: {fumedadherence:901148}    Hiw wife sets up his m    {Afib?:430646} Patient is currently taking {anticoagulants:382946} for " "anticoagulation. Patient reports {anticoagsideeffects:293226}. Patient {DOES/NOT:953049} have a hx of GI bleed.   Patient is also taking ***. Pt reports { :621218}.   Patient {HTNDoes/doesnot:385647}.  {Chads score link:177203}***    Chronic pain:    Current medications include: ***. Pain is described as { :188712}.   How is your pain? { :392355::\"Tolerable with discomfort\"}.  Are you able to do your normal activities? { :035376}.  Are you able to sleep? {SLEEP AND PAIN:993527}. Patient reports the following side effects:  {Pain side effects such as constipation/N/V, sedation:197619}.    : ***  ***: ***  ***: ***  ***: ***  ***: ***    Today's Vitals: There were no vitals taken for this visit.      ASSESSMENT:                          {mtmpartdquestion:259027}    Medication Adherence: {adherenceassess:405081}, {ADHERENCEOPTIONSASSES:854476}    ***: ***  ***: ***  ***: ***  ***: ***  ***: ***    PLAN:                          {Remind patient about MTM survey:500590}{YAMILEX?:432816}  ***    I spent {mtm total time 3:120919} with this patient today{MTMpartdbillingquestion:086181}. { :553477}. A copy of the visit note was provided to the patient's {[facility] chart:666772} provider.    Will follow up in ***.    The patient {GIVEN/NOT GIVEN:278003::\"was given\"} a summary of these recommendations. {covisit:573129}    ***          "

## 2020-08-29 RX ORDER — NITROGLYCERIN 0.4 MG/1
0.4 TABLET SUBLINGUAL EVERY 5 MIN PRN
COMMUNITY

## 2020-08-29 RX ORDER — NYSTATIN AND TRIAMCINOLONE ACETONIDE 100000; 1 [USP'U]/G; MG/G
OINTMENT TOPICAL 3 TIMES DAILY PRN
COMMUNITY

## 2020-08-29 RX ORDER — MIRTAZAPINE 30 MG/1
30 TABLET, FILM COATED ORAL AT BEDTIME
COMMUNITY

## 2020-08-29 RX ORDER — QUETIAPINE FUMARATE 25 MG/1
25 TABLET, FILM COATED ORAL 2 TIMES DAILY
COMMUNITY

## 2020-08-29 RX ORDER — HYDROCODONE BITARTRATE AND ACETAMINOPHEN 7.5; 325 MG/1; MG/1
1 TABLET ORAL
COMMUNITY
End: 2021-04-09

## 2020-08-29 RX ORDER — LOSARTAN POTASSIUM 100 MG/1
100 TABLET ORAL DAILY
COMMUNITY

## 2020-08-29 RX ORDER — CLOTRIMAZOLE AND BETAMETHASONE DIPROPIONATE 10; .64 MG/G; MG/G
CREAM TOPICAL 2 TIMES DAILY
COMMUNITY

## 2020-08-29 RX ORDER — BETAMETHASONE DIPROPIONATE 0.5 MG/G
CREAM TOPICAL 2 TIMES DAILY PRN
COMMUNITY
End: 2020-08-29

## 2020-08-29 RX ORDER — MULTIVIT-MIN/IRON/FOLIC ACID/K 18-600-40
2000 CAPSULE ORAL DAILY
COMMUNITY

## 2020-09-01 ENCOUNTER — ALLIED HEALTH/NURSE VISIT (OUTPATIENT)
Dept: PHARMACY | Facility: PHYSICIAN GROUP | Age: 70
End: 2020-09-01
Payer: COMMERCIAL

## 2020-09-01 DIAGNOSIS — G50.0 TRIGEMINAL NEURALGIA: ICD-10-CM

## 2020-09-01 DIAGNOSIS — F41.9 ANXIETY: ICD-10-CM

## 2020-09-01 DIAGNOSIS — R11.0 NAUSEA: ICD-10-CM

## 2020-09-01 DIAGNOSIS — G47.00 INSOMNIA, UNSPECIFIED TYPE: ICD-10-CM

## 2020-09-01 DIAGNOSIS — G31.83 LEWY BODY DEMENTIA WITH BEHAVIORAL DISTURBANCE (H): ICD-10-CM

## 2020-09-01 DIAGNOSIS — Z86.711 HISTORY OF PULMONARY EMBOLISM: ICD-10-CM

## 2020-09-01 DIAGNOSIS — G62.9 PERIPHERAL POLYNEUROPATHY: ICD-10-CM

## 2020-09-01 DIAGNOSIS — R23.2 HOT FLASH IN MALE: Primary | ICD-10-CM

## 2020-09-01 DIAGNOSIS — I10 BENIGN ESSENTIAL HYPERTENSION: ICD-10-CM

## 2020-09-01 DIAGNOSIS — F02.818 LEWY BODY DEMENTIA WITH BEHAVIORAL DISTURBANCE (H): ICD-10-CM

## 2020-09-01 DIAGNOSIS — M62.838 MUSCLE SPASM: ICD-10-CM

## 2020-09-01 DIAGNOSIS — G89.29 OTHER CHRONIC PAIN: ICD-10-CM

## 2020-09-01 DIAGNOSIS — F32.A DEPRESSION, UNSPECIFIED DEPRESSION TYPE: ICD-10-CM

## 2020-09-01 PROCEDURE — 99607 MTMS BY PHARM ADDL 15 MIN: CPT | Performed by: PHARMACIST

## 2020-09-01 PROCEDURE — 99606 MTMS BY PHARM EST 15 MIN: CPT | Performed by: PHARMACIST

## 2020-09-01 NOTE — PROGRESS NOTES
MT ENCOUNTER  SUBJECTIVE/OBJECTIVE:                           Cain Charles is a 70 year old male called for a follow-up visit. He was referred to me from Dr. Sharp.  Today's visit is a follow-up MT visit from 8/25.     Patient consented to a telehealth visit: yes  Telemedicine Visit Details  Type of service:  Telephone visit  Start Time: 11:01 AM  End Time: 11:48 AM  Originating Location (pt. Location): Home  Distant Location (provider location):  [facility]  Mode of Communication:  Telephone    Chief Complaint: Medication review.  Patients main concern is the hot flashes he is having.  Follow up today to discuss possible medication causes of his hot flashes after doing more research.      Medication Adherence/Access:   He is having problem with his pharmacy and filling his medications.  The Tizanidine the bottle says 1-2 tablets every 8 hours as needed (60 pills).  Pt is using 4-6 per day, and says it isn't lasting him a full 30 days.  Pt would like the quantity for the prescription to be increased.    Hiw wife sets up his medications for him.  He takes is AM medications around 10 am, usually sleeps in a bit.  PM medications are usually around 3-4 PM, and then his bedtime is around midnight.  Takes HS medications around 11:30 or 11:00 PM.    Hot Flashes:  Patient has been experiencing hot flashes for the last 3-4 months, and will start sweating very easily.  He can't be outside for long due to these symptoms or engage/play with his grankids due to them. After our last appointment I researched medications that could be contributing to or causing hot flashes.  Possible causes include:  Hydrocodone, escitalopram, or his Exelon patch.  None of these medications were increases recently, and has been on stable doses for a long time. Patient has been reducing his hydrocodone dose gradually, with the last dose decrease occurring a few months ago.    Hot flashes sometimes occur at higher doses  of opioid medications, but it's possible his hot flashes are from reducing doses of hydrocodone or gabapentin.  He is no longer experiencing the diarrhea/severe nausea, and abdominal pain he was initially experiencing with dose decreases of hydrocodone.    Hx PE/HTN/ Hx anterior MI/ Stenting of LAD:   Patient is currently taking warfarin 4 mg daily  aspirin 81mg for anticoagulation and antiplatelet therapy. Patient reports no current concerns of bruising or bleeding. Patient does not have a hx of GI bleed.   Patient is also taking Losartan 100 mg daily, furosemide 40 mg daily, and Atenolol 50 mg daily. Pt reports no current medication side effects. Patient does not self-monitor BP.  [facility]    INR:  7/29 - 2.7  6/30 - 2.3  5/29 - 1.7  5/1 - 1/8 4/1/2020:  LVEF 45%     ECHO(3/6/17)  QT/QTc - 400/408     Lewy Body Dementia:  Patient currently taking Exelon 9.5 mg patch once daily.  Over the years his memory has gotton worse.  He notes that his short term memory is poor.  Patient having horrific nightmares at night.  These started happening in the last month or two.  He usually sleeps for 4 hours and then wakes up due to the dreams.  This is occurring every night.     Chronic pain/Trigeminal Neuralgia/Peripheral Neuropathy/Muscle spasms:    Current medications include: Hydrocodone-APAP 7.5 mg 1.5 tablets three times daily, Gabapentin 600 mg AM/1200 mg PM, Tizanidine 4-8 mg three times daily (taking scheduled).     He has been slowly reducing down his dose of his hydrocodone over the last couples years and his gabapentin dose.  He has been in a lot of pain lately, mostly in his back and legs.  It has been 3-4 months ago since his last dose decrease of either hydrocodone/gabapentin.  His muscle spasms at night have gotten terrible, and occur mostly at night.  The nerve pain in his feet at night are terrible as well.  He is noticing more nerve pain in his feet.   Sometimes it is so intense he cannot sleep.  Has been on cyclobenazprine and it wasn't effective.     Patient is taking medical marijuana.  He is thinking of backing off on it due to the cost.  Pt denies constipation, if anything has diarrhea.  Has a lot of anxiety which he thinks affects his bowels.  He gets enormous hot flashes, that have been going on for 3-4 months.  Does get some sedation/dizzines.     Patient reports the following side effects:  Confusion, Depression, Lethargy, Memory Loss (also has lewy body dementia), Nausea, Sedation, severe sedation/fatigue, hot flashes, balance issues, and falls.  Hot flashes/sweating which started about 3-4 months ago. Neuropathy also contributes to balance issues and falls.  MME:  33.75     Nausea:  Currently taking Ondansetron 8 mg ODT three times PRN.  Pt denies side effects and it is effective when needed.  The nausea is hard to nail down what causes it, doesn't notice it at specific times.  May go days without the nausea, and then it is there for a few days.       Depression/Anxiety/Insomnia:    Current medications include: Escitalopram 20 mg once daily, Mirtazapine 30 mg daily, Quetiapine 25 mg twice daily/ 50 mg HS, and Alprazolam 1 mg three times daily (takes two tabs every day and 3 tabs a few days per week).    Pt is also having horrific nightmares, these dreams wake him up at night.  These started happening again in the last month or two.  Usually will sleep for 4 hours, and then wakes up due to dreams.  Pt thinks he has been feeling down and more anxious lately.  He does have a lot of anxiety.  Pt has tried melatonin and it isn't effective, not using it. He also has never used the hydroxyzine on his medication list.   Patient was started on quetiapine by his previous psychiatrist Dr. Earl.  It was started after patient's good friend committed suicide, and the pt helped clean up the persons home which was very distressing for him to see.  He was started on  quetiapine and it was helpful for anxiety.  Hx of previous medications:  - Venlafaxine:  Stopped due to diarrhea, worsening in muscle cramps  - Fluoxetine:  Pt's response is unclear.  - Bupropion:  Pt's response is unclear.  - Paroxetine: Pt's response is unclear.       Vitals: Ht 73, Wt 239.6, BMI 31.61, Comments HT/WT w/shoes, Pulse 78, Resp 14, /70, Arm / Cuff size Large:left, Initials nc/ma.         Today's Vitals: There were no vitals taken for this visit.  - telephone encounter    ASSESSMENT:                              Medication Adherence: good, no issues identified    Hot flashes:  Do not feel that hot flashes are likely being caused by his escitalopram or Exelon patches because they have not been increased in the last year.  Opioid withdrawal is not likely the cause of his hot flashes because it has been a few months since the last dose decrease in hydrocodone, and hot flashes/cold sweats usually occur withint the first 14 days of reductions.  Patient could have low testosterone levels from chronic opioid therapy.  May be beneficial to check testostorone, and TSH to rule out other possible causes of hot flashes.  Will discuss with Dr. Sharp.     Hx PE/HTN/ Hx anterior MI/ Stenting of LAD:    Stable. Patient is meeting BP goal of < 140/90mmHg.      Lewy Body Dementia:  Stable.  Patients hot flashes and nightmares could be somatic symptoms of Lewy body dementia.  They could also be medication side effects of escitalopram, hydrocodone, Exelon patch.     Chronic pain/Trigeminal Neuralgia/Peripheral Neuropathy/Muscle spasms:    Needs improvement.  Patient is still having a lot of neuropathy and muscle spasms.  Tizinidine can cause hypotension/dizziness, and sedation. Patient's sedation is likely a combination of medication side effects from hydrocodone, tizanidine, quetiapine, gabapentin, and alprazolam.  He has greatly reduced doses of his pain meds.  May be beneficial to try switching to a less  sedating muscle relaxer like Methocarbamol.  Pt taking close to the max dose of tizanidine every day, so would suggest switching to methocarbamol 750 mg three times daily to start with.  Patient is at a higher risk of CNS depression, respiratory failure, and overdose with concomitant use of hydrocodone (33.75 MME), alprazolam, tizanidine, and gabapentin.  Given short term memory issues, there could be concern for medication mistakes and accidental overdoses.  Patient may benefit from having Narcan available as safety precaution.       Nausea:  Stable.  Nausea could be a side effect of his hydrocodone, but also sounds like it is linked to anxiety. Patient's GI upset in the morning could be caused by several of his medications.  Most likely would be vitamin D and aspirin, which he could try taking at bedtime to see if it's less noticeable.     Depression/Anxiety/Insomnia:    Needs improvement.  Patient is having significant anxiety, depression and issues sleeping.  Much of his insomnia is from the vivid dreams and muscle cramps waking him up.  His pain is also not well controlled and interferes with sleep.  Patient would likely benefit from switching from escitalopram to an SNRI like duloxetine, venlafaxine, or Fetzima.  An SNRI may help with his chronic pain and neuropathy in his feet.  Would suggest trying Fetzima first if it's covered because it more potently increases norepinephrine vs serotonin (3:1 ratio).  Patient vivid dreams could be a side effect of several medications, but also could be related to douglas body dementia.  Possible meds: mirtazapine, alprazolam, quetiapine, escitalopram.  Alprazolam is a high risk medication, especially given this patients history of falls, memory issues, and dementia. Would likely be beneficial for him to slowly taper down the dose over a period of a 1-3 years.  Could reduce dose by 0.25 mg every 2-3 months as tolerated.  Would be best to first focus on his main concerns of pain  and the hot flashes before changing.  Would not recommend the patient use hydroxyzine due to risk of exacerbating dementia, falls, balance issues, memory problems, and confusion.      PLAN:                            1.  Recommend checking testosterone and TSH levels.    I spent 45 minutes with this patient today. I offer these suggestions for consideration by Dr. Sharp. A copy of the visit note was provided to the patient's primary care provider.    Will follow up in 1-2 weeks after talking with Dr. Sharp.    The patient declined a summary of these recommendations.     Dena Hewitt, PharmD  Medication Therapy Management Pharmacist  Pager: [phone]

## 2021-02-02 ENCOUNTER — HOSPITAL ENCOUNTER (OUTPATIENT)
Dept: NEUROLOGY | Facility: CLINIC | Age: 71
Setting detail: THERAPIES SERIES
Discharge: STILL ADMITTED OR ADMINISTRATIVE | End: 2021-02-02
Attending: PSYCHOLOGIST

## 2021-02-02 DIAGNOSIS — F33.0 MAJOR DEPRESSIVE DISORDER, RECURRENT EPISODE, MILD (H): ICD-10-CM

## 2021-02-22 VITALS — HEART RATE: 76 BPM | DIASTOLIC BLOOD PRESSURE: 80 MMHG | SYSTOLIC BLOOD PRESSURE: 120 MMHG

## 2021-03-18 ENCOUNTER — RECORDS - HEALTHEAST (OUTPATIENT)
Dept: LAB | Facility: CLINIC | Age: 71
End: 2021-03-18

## 2021-03-18 LAB
ANION GAP SERPL CALCULATED.3IONS-SCNC: 10 MMOL/L (ref 5–18)
BUN SERPL-MCNC: 8 MG/DL (ref 8–28)
CALCIUM SERPL-MCNC: 8.2 MG/DL (ref 8.5–10.5)
CHLORIDE BLD-SCNC: 103 MMOL/L (ref 98–107)
CHOLEST SERPL-MCNC: 232 MG/DL
CO2 SERPL-SCNC: 28 MMOL/L (ref 22–31)
CREAT SERPL-MCNC: 1.02 MG/DL (ref 0.7–1.3)
FASTING STATUS PATIENT QL REPORTED: ABNORMAL
GFR SERPL CREATININE-BSD FRML MDRD: >60 ML/MIN/1.73M2
GLUCOSE BLD-MCNC: 119 MG/DL (ref 70–125)
HDLC SERPL-MCNC: 27 MG/DL
LDLC SERPL CALC-MCNC: 130 MG/DL
LDLC SERPL CALC-MCNC: ABNORMAL MG/DL
POTASSIUM BLD-SCNC: 3.3 MMOL/L (ref 3.5–5)
SODIUM SERPL-SCNC: 141 MMOL/L (ref 136–145)
TRIGL SERPL-MCNC: 438 MG/DL

## 2021-04-07 ENCOUNTER — VIRTUAL VISIT (OUTPATIENT)
Dept: PHARMACY | Facility: PHYSICIAN GROUP | Age: 71
End: 2021-04-07
Payer: COMMERCIAL

## 2021-04-07 DIAGNOSIS — K21.9 GASTROESOPHAGEAL REFLUX DISEASE WITHOUT ESOPHAGITIS: ICD-10-CM

## 2021-04-07 DIAGNOSIS — I10 BENIGN ESSENTIAL HYPERTENSION: ICD-10-CM

## 2021-04-07 DIAGNOSIS — E78.5 HYPERLIPIDEMIA LDL GOAL <100: ICD-10-CM

## 2021-04-07 DIAGNOSIS — I25.2 HISTORY OF MI (MYOCARDIAL INFARCTION): ICD-10-CM

## 2021-04-07 DIAGNOSIS — R23.2 HOT FLASH IN MALE: Primary | ICD-10-CM

## 2021-04-07 DIAGNOSIS — M62.838 MUSCLE SPASM: ICD-10-CM

## 2021-04-07 DIAGNOSIS — F32.A DEPRESSION, UNSPECIFIED DEPRESSION TYPE: ICD-10-CM

## 2021-04-07 DIAGNOSIS — G89.29 OTHER CHRONIC PAIN: ICD-10-CM

## 2021-04-07 DIAGNOSIS — Z86.711 HISTORY OF PULMONARY EMBOLISM: ICD-10-CM

## 2021-04-07 DIAGNOSIS — Z78.9 TAKES DIETARY SUPPLEMENTS: ICD-10-CM

## 2021-04-07 DIAGNOSIS — F02.818 LEWY BODY DEMENTIA WITH BEHAVIORAL DISTURBANCE (H): ICD-10-CM

## 2021-04-07 DIAGNOSIS — Z95.5 HISTORY OF PLACEMENT OF STENT IN LAD CORONARY ARTERY: ICD-10-CM

## 2021-04-07 DIAGNOSIS — F41.9 ANXIETY: ICD-10-CM

## 2021-04-07 DIAGNOSIS — N40.0 BENIGN PROSTATIC HYPERPLASIA, UNSPECIFIED WHETHER LOWER URINARY TRACT SYMPTOMS PRESENT: ICD-10-CM

## 2021-04-07 DIAGNOSIS — G50.0 TRIGEMINAL NEURALGIA: ICD-10-CM

## 2021-04-07 DIAGNOSIS — G47.00 INSOMNIA, UNSPECIFIED TYPE: ICD-10-CM

## 2021-04-07 DIAGNOSIS — R23.8 SKIN IRRITATION: ICD-10-CM

## 2021-04-07 DIAGNOSIS — G31.83 LEWY BODY DEMENTIA WITH BEHAVIORAL DISTURBANCE (H): ICD-10-CM

## 2021-04-07 DIAGNOSIS — R11.0 NAUSEA: ICD-10-CM

## 2021-04-07 DIAGNOSIS — G62.9 PERIPHERAL POLYNEUROPATHY: ICD-10-CM

## 2021-04-07 PROCEDURE — 99607 MTMS BY PHARM ADDL 15 MIN: CPT | Mod: TEL | Performed by: PHARMACIST

## 2021-04-07 PROCEDURE — 99605 MTMS BY PHARM NP 15 MIN: CPT | Mod: TEL | Performed by: PHARMACIST

## 2021-04-07 RX ORDER — ROSUVASTATIN CALCIUM 5 MG/1
5 TABLET, COATED ORAL
COMMUNITY

## 2021-04-07 NOTE — PROGRESS NOTES
Medication Therapy Management (MTM) Encounter    ASSESSMENT:                            Medication Adherence/Access: No issues identified    Hot flash symptoms/Diaphoresis:  Needs further assessment by MD.  May be beneficial to check TSH and testosterone levels.  Patients chronic fatigue could be side effect of some of the CNS depressant medications, but could also be seeing low testosterone levels from chronic opioid therapy.  Diaphoresis could also be side effect of escitalopram.  Will discuss with Dr. Yang.     Hx PE/HTN/ Hx anterior MI/ Stenting of LAD:   Stable. Patient is meeting blood pressure goal of < 140/90mmHg.  INR within goal 2-3.    Hyperlipidemia: Patient is not on moderate intensity statin which is indicated based on 2019 ACC/AHA guidelines for lipid management.  Trying to slowly add in rosuvastatin.  If tolerated, could gradually increase to 5 mg daily.     Lewy Body Dementia:  Plan in place, recently stopped Exelon patch.  Hopefully will see improvement in his nausea, sedation, and dizziness.     Chronic pain/Trigeminal Neuralgia/Peripheral Neuropathy/Muscle spasms:  Improving since the dose of hydrocodone was increased.  Patient may be a good candidate for a SNRI.  Didn't tolerate venlafaxine, but could consider trying to change to duloxetine for pain and depression.  Escitalopram could be another possible cause of diaphoresis.  Would be beneficial to check his vitamin D to rule out as possible cause of depression/fatigue.     Nausea:  Unchanged.  May be exelon patch contributing, so hopefully will improve over next few weeks.  Other possible causes include hydrocodone and quetiapine.     Depression/Anxiety/Insomnia:  Quetiapine may be contributing to daytime sedation and nausea.  Could consider slowly tapering off medication.  If anxiety or depression worsens could try changing to a less sedating second generation antipsychotic like Abilify.  Recommend he discuss this with his  neurologist.     GERD: Stable. Current treatment is effective. Chronic PPI use places patient at an increased risk of C. Diff, hypomagnesemia and lower bone mineral density, these risks were not  reviewed with the patient today, but will review at follow up     BPH:  Stable.    Skin irritation:  Stable.     Supplements/OTCS: Stable.    PLAN:                            For Dr. Venegas to review:  1.  Recommend tapering off Quetiapine by reducing daily doses 25 mg every 14 days.  If anxiety or depression worsens, could consider changing to Abilify 2 mg daily.    For Dr. Yang to review:  2.  Recommend checking testosterone level, TSH, and vitamin D  at next follow up    Follow-up: 5/5/2021 at 1:00 PM, telephone follow up    SUBJECTIVE/OBJECTIVE:                          Cain Charles is a 71 year old male called for a follow-up visit. He was referred to me from Dr. Sharp.  Today's visit is a follow-up MTM visit from 9/1/2020.     Reason for visit: Follow up medication review.  Pt is wondering if he is taking medications at the correct time.  Notices bloating an urgency after taking them. Nasal congestion, feels plugged up mostly at night, feels tired during the day and sleeps a lot.   Is also feeling very fatigued and tired all of the time, he is wondering if some of the medications could be contributing..        Allergies/ADRs: Compazine - anxiety attack, sulfamethizole, galantamine - tremors, atorvastatin - muscle cramps, simvastatin - muscle cramps, Lyrica - akinesia, increased anxiety, trazodone - nightmares, Cipro   Tobacco:  reports that he has never smoked. He does not have any smokeless tobacco history on file.  Alcohol: not currently using  Caffeine: no caffeine  PMH: Reviewed in EHR     Medication Adherence/Access:  No issues reported, uses a pillbox.  Hiw wife sets up his medications for him.      Hot flash symptoms/Diaphoresis:  Patient has still been noticing symptoms where he feels really  hot/sweaty.  The more active he is, the more he notices heat intolerance and feeling hot.  Reports feeling evry hot, sweating profusely.  He is wondering if these symptoms are caused by any of his medications.       Hx PE/HTN/ Hx anterior MI/ Stenting of LAD:   Patient is currently taking warfarin 4 mg daily and   aspirin 81mg for anticoagulation and antiplatelet therapy. Patient reports no current concerns of bruising or bleeding. Patient does not have a hx of GI bleed.   Patient is also taking Losartan 100 mg daily, furosemide 40 mg daily, and Atenolol 50 mg daily. Pt reports no current medication side effects. Patient does not self-monitor BP.  [facility]    Last INRs are within goal, see in EC[facility].     4/1/2020:  LVEF 45%     ECHO(3/6/17)  QT/QTc - 400/408      Hyperlipidemia: Current therapy includes Rosuvastatin 5 mg three times weekly.  Patient reports no significant myalgias or other side effects since starting it.  Had myopathies with other statins in the past.      CUSTOM LDL,DIRECT - 03/18/2021      NAME VALUE REFERENCE RANGE    LDL CHOLESTEROL DIRECT 130  H        LIPID CASCADE - 03/18/2021      NAME VALUE REFERENCE RANGE    CHOLESTEROL 232  H <=199 (mg/dL)    TRIGLYCERIDES 438  H <=149 (mg/dL)    HDL CHOLESTEROL 27  L >=40 (mg/dL)    LDL CHOLESTEROL CALCULATED     - Invalid, Triglycerides >400    FASTING? Unknown         Lewy Body Dementia:  Patient was taking Exelon, but reports that Dr. Venegas had him stop it.  He said Dr. Venegas doesn't believe he has   Over the years his memory has gotton worse.  He notes that his short term memory is poor.  Patient having terrible nightmares.  Exelon could be contributing to sedation, dizziness, nausea, and possible nightmares.  Hopefully some of these symptoms will improve being off it.  Yesterday was the first day off it so too soon to tell.  Dr. Venegas doesn't think patient has lewy body dementia, but symptoms and records are  more supportive of anoxic injury     Chronic pain/Trigeminal Neuralgia/Peripheral Neuropathy/Muscle spasms:    Current medications include: Hydrocodone-APAP  mg 1 tablet 3-4  times daily, Gabapentin 600 mg AM/1200 mg PM, Tizanidine 4 mg three times daily as needed.  Usually only taking tizanidine at bedtime.  Used to take tizanidine during the day but stopped his daytime doses due to sdation.  Gabapentin may cause some sleepiness.  He does still have a lot of pain in his feet.  Pain improving with higher dose of hydrocodone.  Takes medical marijuana as well, but is expensive.  Pt denies constipation, if anything has diarrhea.  Has a lot of anxiety which he thinks affects his bowels.  He gets enormous hot flashes.  Does get some sedation/dizzines.     Patient reports the following side effects:  Confusion, Depression, Lethargy, Memory Loss (also has lewy body dementia), Nausea, Sedation, severe sedation/fatigue, hot flashes, balance issues, and falls. Still having hot flashes/sweating, could be possible side effect of opioids (if contributing to low T levels).  Neuropathy also contributes to balance issues and falls.  MME:  33.75     Nausea:  Currently taking Ondansetron 8 mg ODT three times PRN.  Pt denies side effects and it is effective when needed.  The nausea is hard to nail down what causes it, doesn't notice it at specific times.  May go days without the nausea, and then it is there for a few days.  Hopefully will improve being off      Depression/Anxiety/Insomnia:    Current medications include: Escitalopram 20 mg once daily, Mirtazapine 30 mg daily at bedtime, Quetiapine 25 mg two times daily/ 50 mg HS, and Alprazolam 1 mg three times daily (takes two tabs every day and 3 tabs a few days per week).    Patient has been depressed more than usual.  His anxiety has been at a much higher level.  His anxiety has been much worse.  His nightmares come and go.  Not having as many as he was.  He really isn't  sure how well these work.    Doesn't notice feeling tired after alprazolam.  Quetiapine could be contributing.  Patient said the quetiapine was started to help with mood/dementia and he would like to get off this if possible.    Hx of previous medications:  - Venlafaxine:  Stopped due to diarrhea, worsening in muscle cramps  - Fluoxetine:  Pt's response is unclear.  - Bupropion:  Pt's response is unclear.  - Paroxetine: Pt's response is unclear.  Melatonin: ineffective     GERD:   Current medications include: Protonix (pantoprazole) 40 mg once daily. Pt c/o no current symptoms.  Patient feels that current regimen is effective.     BPH:   Currently taking Finasteride 5 mg daily, Tamsulosin 0.8 mg daily.  He does sometimes have issues emptying bladder, but has improved a lot since starting these medications.  Going to the bathroom about 2 times at night.  He was getting up at night a lot more often.  He is up every 20 minutes or so after taking BPH medications during the day.       Skin irritation:  Patient uses betamethasone--clotrimazole 1-0.05 cream BID PRN, and Nystatin-triamcinolone ointment three times daily PRN.  Both topicals are effective when needed.     Supplements/OTCS:  Patient currently taking vitamin D 2000 units daily,      Ht 73, Wt 245, BMI 32.32, Pulse 76, /70, Arm / Cuff size L large, Initials as/cma.      Today's Vitals: There were no vitals taken for this visit. - telephone encounter   ----------------    I spent 60 minutes with this patient today. I offer these suggestions for consideration by Dr. Yang. A copy of the visit note was provided to the patient's primary care provider.    The patient was mailed a summary of these recommendations.     Dena Hewitt PharmD  Medication Therapy Management Pharmacist  Pager: [phone]      Telemedicine Visit Details  Type of service:  Telephone visit  Start Time: 10:14 AM  End Time: 11:16 AM  Originating Location (patient location):  Home  Distant Location (provider location):  [facility] MTM      Medication Therapy Recommendations  Chronic pain syndrome    Current Medication: HYDROcodone-acetaminophen (NORCO)  MG per tablet   Rationale: Medication requires monitoring - Needs additional monitoring   Recommendation: Order Lab - Check testosterone, vitamin D, and TSH labs   Status: Contact Provider - Awaiting Response         Depression, major, severe recurrence (H)    Current Medication: QUEtiapine (SEROQUEL) 25 MG tablet   Rationale: Undesirable effect - Adverse medication event - Safety   Recommendation: Discontinue Medication   Status: Contact Provider - Awaiting Response

## 2021-04-09 RX ORDER — POTASSIUM CHLORIDE 1500 MG/1
20 TABLET, EXTENDED RELEASE ORAL DAILY
COMMUNITY

## 2021-04-09 RX ORDER — HYDROCODONE BITARTRATE AND ACETAMINOPHEN 10; 325 MG/1; MG/1
1 TABLET ORAL EVERY 6 HOURS PRN
COMMUNITY

## 2021-04-09 NOTE — PATIENT INSTRUCTIONS
Recommendations from today's MTM visit:                                                    MTM (medication therapy management) is a service provided by a clinical pharmacist designed to help you get the most of out of your medicines.   Today we reviewed what your medicines are for, how to know if they are working, that your medicines are safe and how to make your medicine regimen as easy as possible.      For Dr. Venegas to review:  1.  Recommend tapering off Quetiapine by reducing daily doses 25 mg every 14 days.  If anxiety or depression worsens, could consider changing to Abilify 2 mg daily.    For Dr. Yang to review:  2.  Recommend checking testosterone level, thyroid hormones, and vitamin D  at next follow up    Follow-up: 5/5/2021 at 1:00 PM, telephone follow up    It was great to speak with you today.  I value your experience and would be very thankful for your time with providing feedback on our clinic survey. You may receive a survey via email or text message in the next few days.     To schedule another MTM appointment, please call the clinic directly or you may call the MTM scheduling line at [phone] or toll-free at [phone].     My Clinical Pharmacist's contact information:                                                      Please feel free to contact me with any questions or concerns you have.      Dena Hewitt, Mike  Medication Therapy Management Pharmacist  Pager: [phone]

## 2021-05-20 ENCOUNTER — RECORDS - HEALTHEAST (OUTPATIENT)
Dept: LAB | Facility: CLINIC | Age: 71
End: 2021-05-20

## 2021-05-20 LAB
ANION GAP SERPL CALCULATED.3IONS-SCNC: 9 MMOL/L (ref 5–18)
BASOPHILS # BLD AUTO: 0.1 THOU/UL (ref 0–0.2)
BASOPHILS NFR BLD AUTO: 1 % (ref 0–2)
BUN SERPL-MCNC: 11 MG/DL (ref 8–28)
CALCIUM SERPL-MCNC: 8.2 MG/DL (ref 8.5–10.5)
CHLORIDE BLD-SCNC: 101 MMOL/L (ref 98–107)
CO2 SERPL-SCNC: 32 MMOL/L (ref 22–31)
CREAT SERPL-MCNC: 1.1 MG/DL (ref 0.7–1.3)
EOSINOPHIL # BLD AUTO: 0.2 THOU/UL (ref 0–0.4)
EOSINOPHIL NFR BLD AUTO: 2 % (ref 0–6)
ERYTHROCYTE [DISTWIDTH] IN BLOOD BY AUTOMATED COUNT: 13.9 % (ref 11–14.5)
GFR SERPL CREATININE-BSD FRML MDRD: >60 ML/MIN/1.73M2
GLUCOSE BLD-MCNC: 110 MG/DL (ref 70–125)
HCT VFR BLD AUTO: 40.8 % (ref 40–54)
HGB BLD-MCNC: 13.5 G/DL (ref 14–18)
IMM GRANULOCYTES # BLD: 0 THOU/UL
IMM GRANULOCYTES NFR BLD: 0 %
LYMPHOCYTES # BLD AUTO: 1.1 THOU/UL (ref 0.8–4.4)
LYMPHOCYTES NFR BLD AUTO: 14 % (ref 20–40)
MCH RBC QN AUTO: 28.9 PG (ref 27–34)
MCHC RBC AUTO-ENTMCNC: 33.1 G/DL (ref 32–36)
MCV RBC AUTO: 87 FL (ref 80–100)
MONOCYTES # BLD AUTO: 0.4 THOU/UL (ref 0–0.9)
MONOCYTES NFR BLD AUTO: 6 % (ref 2–10)
NEUTROPHILS # BLD AUTO: 6.1 THOU/UL (ref 2–7.7)
NEUTROPHILS NFR BLD AUTO: 77 % (ref 50–70)
PLATELET # BLD AUTO: 204 THOU/UL (ref 140–440)
PMV BLD AUTO: 10.4 FL (ref 8.5–12.5)
POTASSIUM BLD-SCNC: 3.1 MMOL/L (ref 3.5–5)
RBC # BLD AUTO: 4.67 MILL/UL (ref 4.4–6.2)
SODIUM SERPL-SCNC: 142 MMOL/L (ref 136–145)
TSH SERPL DL<=0.005 MIU/L-ACNC: 0.71 UIU/ML (ref 0.3–5)
WBC: 7.9 THOU/UL (ref 4–11)

## 2021-05-21 LAB — 25(OH)D3 SERPL-MCNC: 40.5 NG/ML (ref 30–80)

## 2021-05-25 LAB
SHBG SERPL-SCNC: 32 NMOL/L (ref 11–80)
TESTOST FREE SERPL-MCNC: 5.74 NG/DL (ref 4.7–24.4)
TESTOST SERPL-MCNC: 288 NG/DL (ref 240–950)

## 2021-05-26 ENCOUNTER — RECORDS - HEALTHEAST (OUTPATIENT)
Dept: ADMINISTRATIVE | Facility: CLINIC | Age: 71
End: 2021-05-26

## 2021-05-27 ENCOUNTER — RECORDS - HEALTHEAST (OUTPATIENT)
Dept: ADMINISTRATIVE | Facility: CLINIC | Age: 71
End: 2021-05-27

## 2021-05-28 NOTE — PROGRESS NOTES
[facility] Progress Note    Assessment:    1.  Chronic atypical chest pains  2.  Coronary artery disease status post previous coronary artery stenting  3.  Moderate ischemic cardiomyopathy secondary to previous anterior myocardial infarction compensated  4.  Hyperlipidemia  5.  History of pulmonary embolism on chronic Coumadin therapy    Plan:    Continue patient's current medical regiment.  Will obtain a echocardiogram in 1 year to reassess his underlying left ventricular systolic function    An After Visit Summary was printed and given to the patient.    Subjective:    69-year-old male who presented with an acute anterior myocardial infarction with subsequent stenting to his mid left anterior descending artery 2014.  He has had a chronic underlying ischemic cardiomyopathy with ejection fraction range of 35%.  Repeat coronary angiography was performed in November 2016 for chronic recurrent chest pain.  His LAD stent was widely patent with a normal circumflex artery and mild stenosis in his right coronary artery.  The patient continues to have intermittent substernal chest discomfort not exertionally related lasting seconds to an hour in duration.  He denies any symptoms consistent with congestive heart failure    Problem List:    Patient Active Problem List   Diagnosis     Hypertension     Trigeminal neuralgia     GERD (gastroesophageal reflux disease)     BPH (benign prostatic hyperplasia)     Chest pain     Panic disorder without agoraphobia     History of pulmonary embolism- 2014, on warfarin     CAD (coronary artery disease) s/p LAD stent     DNI (do not intubate)     Abnormal stress test     Major neurocognitive disorder with Lewy bodies, probable, with behavioral disturbance     Suicidal ideations         Current Outpatient Medications   Medication Sig Dispense Refill     ALPRAZolam (XANAX) 0.5 MG tablet Take 1 tablet (0.5 mg total) by mouth 3 (three) times a day as needed for anxiety. 60  tablet 0     aspirin 81 MG EC tablet Take 1 tablet (81 mg total) by mouth daily.  0     atenolol (TENORMIN) 100 MG tablet Take 100 mg by mouth daily.       cholecalciferol, vitamin D3, 1,000 unit tablet Take 2,000 Units by mouth daily.       escitalopram oxalate (LEXAPRO) 20 MG tablet Take 1.5 tablets (30 mg total) by mouth daily. 135 tablet 2     finasteride (PROSCAR) 5 mg tablet Take 5 mg by mouth daily.       furosemide (LASIX) 20 MG tablet Take 40 mg by mouth daily.        gabapentin (NEURONTIN) 600 MG tablet Take 1,800 mg by mouth at bedtime.              losartan (COZAAR) 50 MG tablet Take 50 mg by mouth daily.       mirtazapine (REMERON) 30 MG tablet Take 1 tablet (30 mg total) by mouth at bedtime. 90 tablet 3     ondansetron (ZOFRAN) 8 MG tablet Take 8 mg by mouth every 8 (eight) hours as needed for nausea.       pantoprazole (PROTONIX) 40 MG tablet Take 40 mg by mouth every morning.       QUEtiapine (SEROQUEL) 25 MG tablet Take 1 tablet (25 mg total) by mouth 3 (three) times a day as needed. 270 tablet 1     QUEtiapine (SEROQUEL) 50 MG tablet Take 1 tablet (50 mg total) by mouth at bedtime. 90 tablet 1     rivastigmine (EXELON) 9.5 mg/24 hr Place 1 patch on the skin daily. 90 patch 3     tamsulosin (FLOMAX) 0.4 mg Cp24 Take 0.8 mg by mouth every evening.        tiZANidine (ZANAFLEX) 4 MG tablet Take 4-8 mg by mouth every 8 (eight) hours as needed (spasms).        venlafaxine (EFFEXOR XR) 37.5 MG 24 hr capsule Take 1 capsule (37.5 mg total) by mouth daily. Take with 75mg cap (112.5mg) 30 capsule 6     venlafaxine (EFFEXOR XR) 75 MG 24 hr capsule Take 1 capsule (75 mg total) by mouth daily. Take with 37.5mg cap (112.5mg) 30 capsule 6     gabapentin (NEURONTIN) 600 MG tablet Take 600 mg by mouth every morning.       melatonin 3 mg Tab tablet Take 1 tablet (3 mg total) by mouth at bedtime as needed.  0     metoclopramide (REGLAN) 5 MG tablet Take 1 tablet (5 mg total) by mouth 4 (four) times a day as needed for  nausea (before meals and bedtime). 15 tablet 0     nitroglycerin (NITROSTAT) 0.4 MG SL tablet Place 0.4 mg under the tongue every 5 (five) minutes as needed for chest pain.       warfarin (COUMADIN/JANTOVEN) 4 MG tablet Take 1 tablet (4 mg total) by mouth daily for 5 days. 5 tablet 0     warfarin (COUMADIN/JANTOVEN) 4 MG tablet Take 4 mg by mouth once.  3     No current facility-administered medications for this visit.        .  Past Surgical History:   Procedure Laterality Date     APPENDECTOMY       BACK SURGERY       CARDIAC CATHETERIZATION  11/21/2016    no change, no intervention     CARDIAC CATHETERIZATION  12/2013     CORONARY ANGIOPLASTY  12/2013    2 stents placed     ESOPHAGOGASTRODUODENOSCOPY N/A 10/30/2015    Procedure: ESOPHAGOGASTRODUODENOSCOPY using biopsy forceps;  Surgeon: Hamilton Sanchez MD;  Location: [facility];  Service:      SC APPENDECTOMY      Description: Appendectomy;  Recorded: 10/17/2011;     SC ARTHRODESIS ANT INTERBODY MIN DISCECTOMY,LUMBAR      Description: Lumbar Vertebral Fusion;  Recorded: 10/17/2011;     PROSTATE SURGERY      x2 per pt  for enlarged prostate     SKIN BIOPSY Right     face       .  Social History     Socioeconomic History     Marital status:      Spouse name: Not on file     Number of children: 2     Years of education: Not on file     Highest education level: Not on file   Occupational History     Not on file   Social Needs     Financial resource strain: Not on file     Food insecurity:     Worry: Not on file     Inability: Not on file     Transportation needs:     Medical: Not on file     Non-medical: Not on file   Tobacco Use     Smoking status: Never Smoker     Smokeless tobacco: Never Used   Substance and Sexual Activity     Alcohol use: No     Comment: No alcohol in 34 years (since 1980)     Drug use: No     Comment: medical marijuana.      Sexual activity: Not on file   Lifestyle     Physical activity:     Days per week: Not on file     Minutes  "per session: Not on file     Stress: Not on file   Relationships     Social connections:     Talks on phone: Not on file     Gets together: Not on file     Attends Sabianism service: Not on file     Active member of club or organization: Not on file     Attends meetings of clubs or organizations: Not on file     Relationship status: Not on file     Intimate partner violence:     Fear of current or ex partner: Not on file     Emotionally abused: Not on file     Physically abused: Not on file     Forced sexual activity: Not on file   Other Topics Concern     Not on file   Social History Narrative    Patient is prescribed medical marijuana. Lives with his wife.       .  Family History   Problem Relation Age of Onset     Heart attack Mother      Other Father         Abdominal Aneurysm     Heart attack Brother      Heart attack Brother      Hypertension Son      Hyperlipidemia Son      Review of Systems:  General: Night Sweats  Eyes: WNL  Ears/Nose/Throat: WNL  Lungs: Shortness of Breath  Heart: Chest Pain, Arm Pain, Shortness of Breath with activity, Irregular Heartbeat, Leg Swelling  Stomach: Heartburn, Nausea  Bladder: Frequent Urination at Night  Muscle/Joints: Joint Pain, Muscle Weakness, Muscle Pain  Skin: Rash  Nervous System: Falls, Dizziness, Loss of Balance  Mental Health: Depression, Anxiety     Blood: Easy Bleeding, Easy Bruising    Objective:     /74 (Patient Site: Left Arm, Patient Position: Sitting, Cuff Size: Adult Regular)   Pulse 60   Resp 16   Ht 6' 2\" (1.88 m)   Wt (!) 243 lb (110.2 kg)   BMI 31.20 kg/m    (!) 243 lb (110.2 kg)   [unfilled]    Physical Exam:    GENERAL APPEARANCE: alert, no apparent distress  HEENT: no scleral icterus or xanthelasma  NECK: jugular venous pressure normal  CHEST: symmetric, the lungs were clear to auscultation  CARDIOVASCULAR: regular rhythm without murmur, click, or gallop; no carotid bruits  ABDOMEN: nondistended, nontender, bowel sounds " present  EXTREMITIES: no cyanosis, clubbing 1+ bilateral lower leg edema    Cardiac Testing:    None in the last year      Lab Results:    LIPIDS:  Lab Results   Component Value Date    CHOL 261 (H) 02/13/2019    CHOL 236 (H) 08/03/2017    CHOL 258 (H) 08/12/2016     Lab Results   Component Value Date    HDL 33 (L) 02/13/2019    HDL 28 (L) 08/03/2017    HDL 32 (L) 08/12/2016     Lab Results   Component Value Date    LDLCALC 167 (H) 02/13/2019    LDLCALC  08/03/2017      Comment:      Invalid, Triglycerides >400    LDLCALC  08/12/2016      Comment:      Invalid, Triglycerides >400     Lab Results   Component Value Date    TRIG 304 (H) 02/13/2019    TRIG 506 (H) 08/03/2017    TRIG 472 (H) 08/12/2016     No components found for: CHOLHDL    BMP:  Lab Results   Component Value Date    CREATININE 1.10 02/13/2019    BUN 11 02/13/2019     02/13/2019    K 4.4 02/13/2019     02/13/2019    CO2 27 02/13/2019         Sanjeev Rodriguez MD,F.A.C.C.  [facility]  [phone]

## 2021-05-28 NOTE — TELEPHONE ENCOUNTER
Pt called stating that he is barely getting 6-7 hours of sleep at night.  Waking up with intense headaches.  Wondering if there could be a medication change or what he should do to help this situation.

## 2021-05-29 NOTE — PROGRESS NOTES
How have you been doing since we saw you last? Please note any concerns.  Pt would like to discuss getting a second opinion on his diagnosis. He mentioned that he is tapering off of Venlafaxine.     Please list any recent hospitalizations/surgeries/procedures you've had since we saw you last:  None

## 2021-05-29 NOTE — PROGRESS NOTES
Assessment / Impression   1.  Dementia with Lewy bodies  2.  Insomnia  3.  History of delirium  4.  Other medical problems as noted      Plan:   1.  Sleep hygiene including bedtime at 10 PM, a.m. sunlight exposure and physical activity, use of melatonin LA 10 mg at at bedtime and provision of mirtazapine at 9 PM as opposed to midnight as well as other aspects of sleep hygiene  2.  Repeat neuropsychometric testing per patient and spouse request  3.  Spouse to call with patient's current med list as our medication list is inaccurate  For.  The patient is tapering in antidepressant therapy on advice of his primary care physician    A long conversation held with the patient who presents predominantly for insomnia.  Advised regarding sleep hygiene provided as noted above.  The spouse and patient once again question the validity of a prior diagnosis of dementia with Lewy bodies and have requested repeat neuropsychometric testing.  This will be arranged and I will review results of testing upon completion with the patient and spouse.  The concern here relates to the reality that the patient has done quite well over the 6 years that he has been seen in this clinic for his neuropsychiatric difficulties.  It was my impression at the time of the patient's original presentation that his severe medical illness (pulmonary embolisms) and use of narcotic agents allowed us to observe susceptibility to delirium.  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  Responding well to cholinesterase inhibitor therapy, the patient has demonstrated recurrent delirium during periods where he is withdrawn himself from such therapy.  Again it is my clinical opinion that the patient does suffer from DLB but that he is doing quite well with his current treatment program.  This success has resulted in an understandable degree of  uncertainty on the part of patient and spouse regarding the diagnosis and thus the rationale for repeating neuropsychological testing at this time (his spouse believes the patient's cognitive function to be normal at this time).  I also discussed with the spouse the option of a second opinion and have expressed my support for such an evaluation if this would be of help moving forward.  We will start with repeat neuropsychometric testing however.      Total time for evaluation 30 minutes with majority time spent in counseling.      Subjective:     HPI: Cain Charles is a 69 y.o. male with above-noted diagnoses who returns for follow-up.  The patient has not been seen in this clinic since last fall.  He continues to do reasonably well but apparently recently has been having some somatic concerns that are believed to possibly be secondary to an antidepressant therapy he is currently receiving.  The patient's primary physician is withdrawing this therapy.  I do not have an accurate medication list at this time and I have asked the patient to call later today with all the medicines he is currently taking.    Of concern today is of persistent insomnia.  The patient reports retiring to bed at midnight and getting to sleep but then awakening after 2 to 3 hours and finding it difficult to fall back to sleep.  He oftentimes does not arise from bed until 9 or 10 in the morning.  He does not obtain a.m. sunlight exposure and other aspects of sleep hygiene are of some concern.  There is been no recurrent confusion or visual hallucinations while on cholinergic therapy (see prior note regarding deterioration in the patient's clinical condition with medication withdrawal).  As noted above, the patient spouse believes the patient to be cognitively normal at this time and she is questioning my prior clinical assessment.          Review of Systems:          As noted        Objective:     Vitals:    06/12/19 1114   BP: 135/75    Pulse: 62       No results found for this or any previous visit (from the past 24 hour(s)).    Physical Exam: The patient is neatly groomed casually dressed and seated for evaluation.  He is alert pleasant and directable.  No overt evidence of confusion noted at this time.  The patient has not experienced recent visual illusions or hallucinations.  No tremors have been noted.  No recent falls.  Affect is pleasant and mood congruent.

## 2021-05-30 VITALS — BODY MASS INDEX: 34.15 KG/M2 | WEIGHT: 266 LBS

## 2021-05-31 VITALS — BODY MASS INDEX: 32.23 KG/M2 | WEIGHT: 251 LBS

## 2021-05-31 VITALS — WEIGHT: 259 LBS | BODY MASS INDEX: 33.25 KG/M2

## 2021-05-31 VITALS — BODY MASS INDEX: 32.74 KG/M2 | WEIGHT: 255 LBS

## 2021-06-01 VITALS — BODY MASS INDEX: 31.57 KG/M2 | WEIGHT: 246 LBS | HEIGHT: 74 IN

## 2021-06-03 VITALS — WEIGHT: 243 LBS | BODY MASS INDEX: 31.18 KG/M2 | HEIGHT: 74 IN

## 2021-06-03 NOTE — PROGRESS NOTES
Assessment / Impression   1.  Dementia with Lewy bodies  2.  Other medical problems as noted    Plan:   1.  I will refer to [facility] for second opinion and ongoing care  2.  Patient has canceled repeat neuropsychometric testing (see prior notes for context).  Obviously, such an evaluation can take place along with consideration for other biomarker tests if needed    A very long conversation with the patient and wife Carolina in attendance.  The patient is generally doing well at this point in time.  He did stop by to say goodbye of my clinic will be closing in December.  I did advise that the patient follow-up through the [facility] and I would appreciate if he could see Dr. Costa.  Again I would refer to my prior notes for contacts.  There have been times in the past where the patient and spouse have question the accuracy of the diagnosis made.  Certainly the circumstances surrounding the patient's illness prior to diagnosis did confound assessment to some extent.  I remain confident of my clinical assessment but I did explain to the patient and spouse that a degree of circumspection and second opinions are always appropriate.    Total time for evaluation 30 minutes with majority time spent in counseling with respect to follow-up.      Subjective:     HPI: Cain Charles is a 69 y.o. male with above-noted diagnoses who returns for follow-up.  The patient appears to be feeling well at this point in time.  He did wish to see me prior to closure of my clinic.  At this point in time no new complaints are noted.          Review of Systems:          As above        Objective:   There were no vitals filed for this visit.    No results found for this or any previous visit (from the past 24 hour(s)).    Physical Exam: As noted previously

## 2021-06-04 VITALS
HEART RATE: 58 BPM | WEIGHT: 247 LBS | DIASTOLIC BLOOD PRESSURE: 73 MMHG | SYSTOLIC BLOOD PRESSURE: 112 MMHG | BODY MASS INDEX: 31.71 KG/M2

## 2021-06-04 VITALS — WEIGHT: 240 LBS | BODY MASS INDEX: 30.81 KG/M2

## 2021-06-04 VITALS
DIASTOLIC BLOOD PRESSURE: 72 MMHG | WEIGHT: 248 LBS | RESPIRATION RATE: 14 BRPM | HEIGHT: 74 IN | BODY MASS INDEX: 31.83 KG/M2 | SYSTOLIC BLOOD PRESSURE: 106 MMHG | HEART RATE: 76 BPM

## 2021-06-04 VITALS — BODY MASS INDEX: 30.8 KG/M2 | HEIGHT: 74 IN | WEIGHT: 240 LBS

## 2021-06-07 NOTE — TELEPHONE ENCOUNTER
Message was passed to me  this morning to follow up with pt  Pt has an appt with Hong tomorrow  Pt was called back this morning, and is very confused about his med change from last week call from Hong and did not follow instructions so I spoke to wife  Pt just got his scale yesterday and has not weighed self  Pt only took lasix 40 mg two times a day,did not  new script that was give 4/8  Reviewed with Hong, pt instructed to weigh self this morning and  to take another 40 mg tab of Lasix this morning   Reviewed he may be voiding more  Reviewed with wife to get note book so when he gets up to void early a.m. record wt and he takes his morning meds and returns to bed and does not get up until noon   Wife fully understands and agrees to plan.

## 2021-06-07 NOTE — PATIENT INSTRUCTIONS - HE
Cain Charles,    It was a pleasure to see you today at the [facility].     My recommendations after this visit include:    Increase furosemide to 40 mg twice a day  Echocardiogram now  Follow-up in person with CHF nurse practitioner next week to assess volume status and check electrolytes    DOMONIQUE Bradley MD, FACC, [facility]

## 2021-06-07 NOTE — PATIENT INSTRUCTIONS - HE
Cain Charles,    It was a pleasure to see you today at the [facility].     My recommendations after this visit include:    Weigh yourself every day.  Call if your weight drops by more than 10 pounds in a week.  If your weight goes up by 3 to 4 pounds over 2 to 3 days.  Call if you have increasing leg cramps.    Stay on furosemide 40 mg 2 tabs in morning and 1 tab in afternoon.  Stay on potassium chloride 20 mEq 1 tab orally every day.    Eat low salt diet.    To followup with me by phone in 1 week.      My contact information:  Hong Aguirre CNP  After Hours or Scheduling  [phone]  My Nurse---Yaneth Hearn [phone]

## 2021-06-07 NOTE — PROGRESS NOTES
ROS was done over the phone and was positive shortness of breath, wheezing, chest pain, shortness of breath with activity, irregular heartbeat, nausea, daytime sleepiness, depression, anxiety, easy bruising.  All other ROS was WNL.

## 2021-06-07 NOTE — PROGRESS NOTES
"Review of systems was done over the phone and are all within normal limits.    Pt still experiences some \"heart racing\".  "

## 2021-06-07 NOTE — TELEPHONE ENCOUNTER
Phone message left from patient with questions regarding his visit with Hong Aguirre last week.    Return call to patient and no answer.  Left message for return call.

## 2021-06-07 NOTE — PATIENT INSTRUCTIONS - HE
Cain Charles,    It was a pleasure to talk to you today from the [facility].     My recommendations after this visit include:    Your lab work shows you don't have heart failure and electrolytes and kidney function are normal.    Keep weighing yourself every day and bring weight log to appointments with Dr. Sharp.    Decrease furosemide to 40 mg 1 tab orally twice a day.  Call if you gain 3-4 lbs over 3 days or 7 lbs in a week.      See Dr. Sharp as planned on May 1    To followup with Dr. Bradley in 1 year.       My contact information:  Hong Aguirre, LIZZ  After Hours or Scheduling  [phone]  My Nurse---Yaneth Hearn [phone]

## 2021-06-07 NOTE — PATIENT INSTRUCTIONS - HE
Cain Charles,    It was a pleasure to talk to you from the [facility].     My recommendations after this visit include:    Get a scale and weigh yourself every morning.  Call if your weight drops by more than 10 lbs in a week.    Increase furosemide to 40 mg 2 tabs in am and 1 tab in the afternoon.    Add potassium 20mEq 1 tab oraly every day.    To followup IN clinic with me next Thursday-April 16--- will call to make appointment.       My contact information:  Hong Aguirre, LIZZ  After Hours or Scheduling  [phone]  My Nurse---Yaneth Hearn [phone]

## 2021-06-09 NOTE — PROGRESS NOTES
Assessment / Impression   1.  Dementia with Lewy bodies  2.  Tremors, dystonias and falls  3.  Depression  4.  Anxiety      Plan:   1.  Patient to return within the next several weeks with all medicines currently being administered.  At that point in time we will consider downward titration of therapies as well as consider trial of low-dose Sinemet    A long conversation held with the patient.  Total time for evaluation 25 minutes with the entirety of time spent in counseling.  The patient who suffers from dementia with Lewy bodies process has been doing remarkably well but has suffered from significant anxiety and depression.  Addition of mirtazapine has been helpful in controlling depression.  The constellation of the patient's symptoms including muscle cramps, tremors and falls suggest the possibility of iatrogenic difficulties associated with medicines as well as the neurodegenerative process itself.  As the patient is alone today I would like to have a conversation with both he and his wife Carolina before making adjustments in medicines.      Subjective:     HPI: Cain Charles is a 66 y.o. male with above-noted problems who is seen in follow-up.  The patient was administered mirtazapine to help with depression and anxiety several weeks ago.  The patient had been experiencing worsening depression.  He reports that addition of mirtazapine has been very helpful in controlling depression.  He also has been taking Seroquel and is found this to be remarkably effective for control of anxiety.  Problematically, he is reporting some falls at night that he believes are not related to loss of consciousness orthostasis.  He complains of weakness in the proximal lower extremity musculature.  In addition, he reports bilateral tremors that clearly have a postural component.  It is unclear whether a resting component is present.  Sleep continues to be a challenge but is improved over that noted previously.    On further  questioning the patient reports feeling reasonably well.  He has been followed here in our clinic for 5 years.          Review of Systems:          As above        Objective:     Vitals:    02/27/17 0829 02/27/17 0830 02/27/17 0831   BP: 147/86 146/86 144/87   Pulse: 75 83 81   Weight: (!) 266 lb (120.7 kg)         No results found for this or any previous visit (from the past 24 hour(s)).    Physical Exam: As noted previously.  From a cognitive behavioral perspective, the patient's alert attentive and without fluctuation in level of consciousness or distractibility.  Affect is a bit flat but mood appears to be good today.  Motor tone is somewhat reduced in the limbs with no tremors noted at this time.  No abnormal thought content or form.

## 2021-06-09 NOTE — PROGRESS NOTES
Persons accompanying you (the patient) today: Self     How have you been doing since we saw you last? Please note any concerns.  Medication f/u, everything has been stable since Remeron started.     Please list any surgeries or procedures you have had since we saw you last:  None     Have you had any falls since your last visit? Yes: 1 fall inside home.     Do you have any pain today? No    With whom do you currently reside? (alone, spouse, family, assisted living, nursing home)  With spouse in single family home.   If assisted living or nursing home, please note name and location of facility: same

## 2021-06-12 NOTE — PROGRESS NOTES
Assessment / Impression     1.  Dementia with Lewy bodies  2.  Depression with anxiety  3.  Ischemic cardiomyopathy    Plan:   1.  Check OT evaluation and compared with prior evaluation  2.  Consider repeat neuropsychometric testing depending on results of #1  3.  Consider adjustments in antidepressant therapy following completion of #1    Long conversation with the patient and wife in attendance.  Overall the patient appears to be doing quite well with present therapies.  He does demonstrate some fluctuation in capabilities and there are times when the wife questions the accuracy of the above-noted diagnosis.  The patient does complain of depression and is interested in pursuing alternative treatments.  I have encouraged the patient to continue with present therapies so that there will be less chance of confounding variables with Occupational Therapy testing.  Depending on results of this test we may wish to pursue repeat neuropsychometric testing.  I will check the patient's prior sleep study and neuropsychometric testing prior to follow-up.      Subjective:     HPI: Cain Charles is a 67 y.o. male with above-noted diagnoses who returns for follow-up.  The patient presented to this clinic approximately 4 years ago after developing delirium associated with an episode of deep venous thrombosis and pulmonary embolism.  Evaluation at that time suggested that the patient had MCI with a dementia with Lewy bodies clinical phenotype.  Responding well to treatments, the patient has done generally quite well.  Currently he is experiencing a good period of stability although he complains of difficulties with depression and anxiety.    The patient today does not appear to be in any acute physical distress.  He is alert and attentive.  Carolina, the patient's wife, notes that her  seems to have no difficulty with short-term recall.  There has been no recurrence of profound delirium or visual  hallucinations.          Review of Systems:          As noted        Objective:     Vitals:    08/30/17 1504 08/30/17 1505 08/30/17 1506   BP: (!) 85/61 132/84 137/84   Pulse: 65 65 66   Weight: (!) 251 lb (113.9 kg)         No results found for this or any previous visit (from the past 24 hour(s)).    Physical Exam: The patient is neatly groomed casually dressed and seated for evaluation.  He is alert pleasant and coherent during the course of evaluation.  No evidence of tremors noted.  Affect is pleasant although the patient does report symptoms of depression.    Addendum sleep study performed in 2013 revealed no evidence of sleep disordered breathing but RLS.  Neuropsychometric testing performed in 2014 did reveal MCI levels impairment consistent with a DLB clinical phenotype.

## 2021-06-12 NOTE — PROGRESS NOTES
Persons accompanying you (the patient) today: Carolina     How have you been doing since we saw you last? Please note any concerns.  No concerns    Please list any surgeries or procedures you have had since we saw you last:  none    Have you had any falls since your last visit? Yes: pt had multiple falls    Do you have any pain today? Yes: spine    With whom do you currently reside? (alone, spouse, family, assisted living, nursing home)  n/a

## 2021-06-13 NOTE — PROGRESS NOTES
Persons accompanying you (the patient) today: jeramy ramos     How have you been doing since we saw you last? Please note any concerns.  ok    Please list any surgeries or procedures you have had since we saw you last:  no    Have you had any falls since your last visit? Yes:     Do you have any pain today? Yes lower back pain    With whom do you currently reside? (alone, spouse, family, assisted living, nursing home)  Living with wife single family home

## 2021-06-13 NOTE — PROGRESS NOTES
Occupational Therapy  Occupational Therapy    Medicare Insurance    Units: 1 Wilber  Total Minutes: 50    Medical Diagnosis: Lewy Body Dementia  Treatment Diagnosis: Cognitive Impairment  Referring Practitioner: Dr. Earl  Date of onset: 8-30-17  Start of care: October 5, 2017    Mr.Kenneth CRESCNECIO Charles was referred by Nabor Earl MD for an occupational therapy outpatient cognitive evaluation. The assessments used in this evaluation will help determine if cognitive impairment is present and if so, how functional daily activity may be affected.  Following the assessments, the occupational therapist may offer education and recommendations to assist caregivers in providing the optimal level of support for their loved one. Cain was seen on 10/5/2017 and was accompanied by his wife, although she remained in the waiting room during the testing.  Parveen presented as appropriately groomed and dressed and ambulated independently.  Interestingly, Parveen and his wife stated they both remember him having a cognitive evaluation completed about 4-5 years ago, but I could not find any record of this evaluation being completed in this clinic. They did not recall test scores or specific information from that evaluation, therefor today's evaluation will be considered a baseline.     Living Arrangement:  Parveen lives with his wife in their own home.   Homemaking:  He stated he assists with housework, although his wife manages.   Financial Management: His wife manages financial matters.   Medications: He stated he is independent with his medication management.   Driving:  He stated he continues to drive, but not at night.   ADL:  He is independent with self cares.     Pain: none stated    OBJECTIVE  Results of assessments are as follows:  ACL: NT  CPT: 5.0 /5.6  MOCA: 22 /30    The above assessment scores indicate a Mild level of impairment.     ASSESSMENT  Recommendations:  Cognitive functioning recommendations: 5.0-5.2: The assessment scores  indicate Cain is able to live independently with weekly check-in supervision. He may have difficulty with complex information and problem solving such as with managing finances and medications. Supervision with managing finances and medications is recommended to ensure accuracy and safety. Cain may also have difficulty with driving due to complex problem solving and mental flexibility required for safe driving. A formal driving evaluation may be appropriate if he is interested in continuing driving. Familiar tasks are completed independently such as dressing, grooming and bathing. Learning new information may be difficult for Cain, be he is able to do so with repetition. Memory aids such as calendars, to-do lists, etc. are typically effective. The use of hazardous tools or activities should be monitored closely.    Thank you for this referral.  If I can be of further assistance, please do not hesitate to contact me.    MEDICARE PATIENT: Yes: [facility]IN #[id number]; Provider # ; Certification Dates: from 10-5-17 to 10-5-17    Physician Recommendation:  1. I certify the need for these services furnished within this plan and while under my care. I agree with the therapist's recommendation for plan of care.  2. If there is any recommendation for modification of therapy plan, please indicate below.    Fouzia Trivedi, OT

## 2021-06-13 NOTE — PROGRESS NOTES
Assessment / Impression   1.  Dementia with Lewy bodies  2.  Depression with anxiety  3.  Ischemic cardiomyopathy      Plan:   1.  Repeat neuropsychometric testing  2.  Follow-up on completion    Long conversation held with the patient and wife Carolina in attendance.  OT evaluation reveals [place] 22/30 and CPT 5.0.  The patient did undergo neuropsychometric testing nearly 4 years ago at the onset of clinical illness revealing an MCI level of impairment with the pattern of deficits that could be consistent with DLB.  The patient has responded to treatment consistent with what might be expected with the synucleinopathy but family members remain unconvinced of the diagnosis and concerned that we may be wrong in this instance.  Because of this and after much conversation we have agreed to repeat neuropsychometric testing.  I did inform the family that I was not questioning the clinical diagnosis at this time based on above-noted information.    Total time for evaluation 30 minutes with the majority time spent counseling.  All questions answered.      Subjective:     HPI: Cain Charles is a 67 y.o. male with above-noted diagnoses who returns for follow-up.  The patient underwent occupational therapy evaluation with test scores as noted above.  Family member still question the validity of the clinical diagnosis as has been noted previously.  The patient was diagnosed today very early symptomatic/asymptomatic stage and has been receiving good therapy and I believe this to be the reason that he continues to do relatively well.  Having said this, however, OT evaluation does reveal a mild dementia level of impairment for his neuropsychological testing performed 4 years ago revealed an MCI level of impairment.  Having said this, OT evaluation is certainly not specific for etiology and not nearly as informative as formal neuropsychometric testing.  The family seems to understand this.          Review of Systems:          No  complaints noted        Objective:     Vitals:    10/05/17 0956 10/05/17 0957 10/05/17 0958   BP: 115/71 (!) 139/117 103/66   Pulse: 68 68 73   Weight: (!) 259 lb (117.5 kg)         No results found for this or any previous visit (from the past 24 hour(s)).    Physical Exam: As noted previously.  The patient has not confused.  Affect is pleasant and mood congruent.

## 2021-06-14 NOTE — PROGRESS NOTES
"Psychology Progress Note    Date: 2/2/2021    Time length and type of treatment: [id number] , individual therapy, telephone visit    Necessity: This session is necessary to address chronic depression in the context of complex medical illness.  Patient was last seen on 12/7/2018.  The patient reports that he was referred by his psychiatrist and would like to meet only for today's session.    After review of the patient's situation, this visit was changed from an in-person visit to a telephone visit via [employer] to reduce the risk of COVID 19 exposure.  Patient was given the option of a video visit.  Patient declined indicating preference for phone session and not having adequate technology to support a video session.  Patient was informed that policies and procedures that govern in-person sessions would also apply to telephone sessions.  Patient was also informed that telephone sessions would be discontinued when COVID 19 exposure is no longer a concern (as determined by [facility]).      Patient distance location: home  Provider distance location: [facility] [place]    Patient was in agreement with proceeding with a telephone session.    Intervention: \"I don't have Lewy Body.\"  \"I am having a hard time digesting this.\"  Patient reports that he is seeing Dr. Venegas who told him (about 3 months ago) that he does not have DLB.  Patient reports a lot of mixed feelings because thinking that he had DLB he sold his home and that he thought that he was going to die.  \"I am angry and happy at the same time.\"  If I would have known, I wouldn't live here in the basement.  It's not what I expected.  I have ups and downs about living here.  Patient reports that he is also on the upswing.  \"My wife tells me that I am doing much better.\"    This writer validates that the patient is experiencing mixed feelings.  This writer also reminds the patient that it is important not to " "\"rewrite\" history.  We discuss that he was having significant issues for a long time.  We discuss that his wife and he were having a hard time managing their home because of his many medical issues that impacted his ability to take care of the home.  We also discuss that living with his son's family during COVID was much better for him than being isolated where he was living.  Patient is in agreement with this discussion and feels that he does not need to continue sessions with this writer.  \"I only called because Dr. Venegas wanted me to.\"    This writer utilized motivational interviewing, active listening, reassurance and support in the context of cognitive behavioral therapy and ACT therapy to address the above.      Mental Status: Orientation to person, place, and time is in tact.  Recent and remote memory, attention, concentration, language, and fund of knowledge are intact.  Mood appears stable over all with sad affect.  No indication of suicidal ideation, plan, or intent.  No indication of homicidal ideation, plan or intent.    Progress: Patient reports that he has been depressed.  He also reports that he feels that he is on the upswing.    Plan: We mutually agree that further services are not warranted at this time.  Patient was advised on how to initiate services in the future should he his situation change.    Diagnosis:  Major Depressive disorder, recurrent, mild  "

## 2021-06-15 ENCOUNTER — TRANSFERRED RECORDS (OUTPATIENT)
Dept: HEALTH INFORMATION MANAGEMENT | Facility: CLINIC | Age: 71
End: 2021-06-15

## 2021-06-15 NOTE — PROGRESS NOTES
Assessment / Impression   1.  Dementia with Lewy bodies at an MCI level of impairment  2.  Anxiety and depression with worsening symptoms of generalized anxiety  3.  Disrupted sleep-wake patterns  4.  Patient reports transdermal rivastigmine patch frequently falls off (2-3 times per week)      Plan:   1.  Discontinue transdermal rivastigmine.  Begin oral rivastigmine 4.5 mg p.o. twice daily with meals  2.  Consider adjustment in antidepressant therapy should anxiety symptoms fail to improve  3.  Follow-up in 1-2 months    A long conversation with the patient and spouse in attendance.  Please see prior notes.  In brief, this patient was diagnosed with probable dementia with Lewy bodies after developing classic symptoms during a severe medical illness.  He has done well with treatment with no recurrence of prominent DLB symptoms with current therapy.  Please note that the patient experienced dramatic improvement in his clinical condition following initiation of cholinesterase inhibitor therapy.  I explained to the patient and spouse that the patient is doing well with treatment and this is why identifiable progression has not been noted by the family.  In addition, the patient's progression per current neuropsychometric testing is and visuospatial capabilities but he undoubtedly retains capacity in this area as well.  This situation is not unusual for synucleinopathy's and this was expressed and explained in great detail.    With respect to anxiety, the patient has noted increasing anxiety lately.  Whether this relates to recent testing or whether this may be a harbinger of further decline as a result of the synucleinopathy is uncertain.  The patient is to continue to observe intact contact me should symptoms fail to improve following this visit.  Otherwise will follow-up as per schedule.    With respect to insomnia at at bedtime and daytime somnolence I thought it curious that the patient reports periods during the  "day where \"I seem to pass out.\"  I explained that this could be relevant to his Lewy body diagnosis but I did  him in sleep hygiene and I did discuss the possibility that a higher dose of cholinesterase inhibitor therapy would be helpful.  The spouse reminded me that the patient did have significant tremors when attempting a higher dose rivastigmine patch.  With respect to rivastigmine, however, the patch has been falling off frequently and the patient would like to try the oral form of medicine.  I believe that this would be a reasonable intervention and we should observe with this change the patient's sleep-wake patterns to see whether any improvement could be generated.    Total time for evaluation one hour with the majority time spent in counseling.      Subjective:     HPI: Cain Charles is a 67 y.o. male with Lewy body dementia who returns for follow-up.  As noted, the patient has appeared quite stable clinically at home raising questions among the spouse as to whether an accurate diagnosis was made years ago.  The patient had developed fairly classic signs of a Lewy body process following a hospitalization for pulmonary emboli.  He demonstrated rather dramatic improvement in his thinking ability following initiation of cholinesterase inhibitor therapy along with remission of other  signs and symptoms.  We have presumed that his relative stability over the years relates to having been able to begin cholinesterase inhibitor therapies at an otherwise minimally clinically evident stage of illness.    Repeat neuropsychometric testing does demonstrate progression and visuospatial difficulties along with mild prefrontal impairments.  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    The patient has been experiencing more generalized anxiety symptoms lately.  It is unclear as to whether this relates to recent testing or not.          Review of " "Systems:          As above        Objective:     Vitals:    01/05/18 0950 01/05/18 0951 01/05/18 0952   BP: 137/82 118/70 109/70   Pulse: 78 85 86   Weight: (!) 255 lb (115.7 kg)         No results found for this or any previous visit (from the past 24 hour(s)).    Physical Exam: On observation patient is neatly groomed casually dressed and seated for evaluation.  He appears unchanged clinically from that noted previously.  No tremors are identified and the patient complains of no feeling of tremulousness.  Affect is pleasant and mood congruent.  He does admit to rather significant symptoms of generalized anxiety and \"worrying about worrying.\"  He continues to have significant insomnia as well as daytime somnolence and, as per the patient's report, periods were \"I seem to just pass out.\"  No visual experiences have been noted.              "

## 2021-06-15 NOTE — PROGRESS NOTES
"  NEUROPSYCHOLOGICAL CONSULTATION    NAME:  Cain Charles  :  1950    BARNES: 2017    REASON FOR REFERRAL:  Mr. Charles is a 67 y.o., right-handed,  male with an existing diagnosis of dementia with Lewy bodies (DLB), vascular conditions, depression, anxiety, and a complex medical history. In 2012, the patient experienced an episode of acute delirium, possibly related to a morphine overdose, during which time he also sustained bilateral pulmonary emobli. He was hospitalized for several days and was reportedly \"in a coma for five days\" (per patient report). He experienced significant cognitive decline after this event and was initially evaluated by neuropsychology in 2012 with Dr. Brandt Jackson and Dr. Guille Perez. At that time, they concluded that the patient had experienced significant cognitive decline that was thought to be related to possible sequelae from an anoxic brain injury secondary to the pulmonary emboli. Mr. Charles was initially evaluated by Tobi Earl MD, in 2013 after the patient noticed increased forgetfulness. The patient was ultimately diagnosed with synucleinopathy around 2013 and was placed on a cholinesterase inhibitor. He was referred for an updated neuropsychological evaluation through [facility], and was seen by Dr. Jenise Stevens in 2014. At that time, he exhibited significant impairments in attention/concentration, confrontation naming, story learning/memory, and in some aspects of executive functioning. Mild weaknesses were also observed in verbal fluency, visual learning/memory, and concept formation. Compared to prior testing in , he was noted to have improved in several areas, likely due to the trial of rivastigmine. Overall, the deficits noted in the  exam were thought to reflect DLB and possibly cerebrovascular factors as well.    Since then, the patient has continued to follow up with Dr. Earl. His DLB " symptoms were noted to be well-managed with medication over the years, although the patient continued to have difficulty managing his depression and anxiety despite several medication trials. The patient and his family began to question the validity of his DLB diagnosis because he was not having many symptoms, despite Dr. Earl's explanation that the patient was diagnosed in the very early stages and has been receiving good therapy to control the symptoms. The patient was referred for an updated evaluation by occupational therapy on 10/5/2017, during which he obtained a score of 22/30 on the [place] Cognitive Assessment (MOCA) and a CPT score of 5.0, which was lower than his level of functioning in 2014. Subsequently, Dr. Earl referred Mr. Charles for an updated neuropsychological evaluation to monitor his cognitive functioning, and to assist with differential diagnosis and care planning.    INTERIM HISTORY OF PRESENTING PROBLEM:  Currently, Mr. Charles reported that he has experienced significant decline in several aspects of his cognition over the past three years since he was last evaluated. Specifically, he endorsed memory problems, including a tendency to forget what he is about to say during conversation, misplacing objects, forgetting recent events and conversations, and becoming more disoriented when he wakes up in the middle of the night. He also endorsed worsening word-finding difficulty, attention/concentration, and processing speed. He did note that he has some good days and some bad days, and also notices some fluctuation in his cognitive functioning within the course of the same day.    With regard to the activities of daily living, Mr. Charles reported that he is mostly independent. He continues to drive and reported that he recently passed a behind-the-wheel driving test. He did acknowledge that he misses turns and exits more often, however. He also manages his own medications, although he forgets to  "take them about once a month on average. He noted that he forgot to take his medications last night. He runs errands independently and without issue. In contrast, his wife took over financial management after he was diagnosed with DLB, although he continues to oversee what she is doing. He noted that he continues to cook but has a tendency to leave the burner on about once every couple of months.    MEDICAL HISTORY:  Mr. Charles's medical history is significant for the following:  Past Medical History:   Diagnosis Date     BPH (benign prostatic hyperplasia)      Cancer     Basal Cell  (on face)     Chest pain 06/18/2016     Chronic pain     back     Coronary artery disease     Hx of angio with (2) stents, lad stent     DNI (do not intubate)      GERD (gastroesophageal reflux disease)      Hypertension      Kidney stone      Lewy body disease      Obesity      Opioid abuse      Pulmonary emboli      Trigeminal neuralgia      The patient reported that his current level of pain is at a 5 out of 10, which is typical for him. Along with the above conditions, he also endorsed episodes of \"passing out and falling,\" which he attributes to a cardiac issue. He noted that he falls about 1-2 times per month. He also endorsed a tremor in his hands, neck, face, and body that increase when he is trying to sleep at night. His balance is occasionally unsteady and he noted difficulty clearing with his right foot. He also continues to experience visual hallucinations of people, which are quite vivid and tend to bother him.    Diagnostic studies:  Head CT dated 4/27/2016 revealed age-related moderate chronic small vessel ischemic disease, and mild generalized cerebral and cerebellar atrophy.     Past Surgical History:   Procedure Laterality Date     APPENDECTOMY       BACK SURGERY       CARDIAC CATHETERIZATION  11/21/2016    no change, no intervention     CARDIAC CATHETERIZATION  12/2013     CORONARY ANGIOPLASTY  12/2013    2 stents " placed     ESOPHAGOGASTRODUODENOSCOPY N/A 10/30/2015    Procedure: ESOPHAGOGASTRODUODENOSCOPY using biopsy forceps;  Surgeon: Hamilton Sanchez MD;  Location: [facility];  Service:      NC APPENDECTOMY      Description: Appendectomy;  Recorded: 10/17/2011;     NC ARTHRODESIS ANT INTERBODY MIN DISCECTOMY,LUMBAR      Description: Lumbar Vertebral Fusion;  Recorded: 10/17/2011;     PROSTATE SURGERY      x2 per pt  for enlarged prostate     SKIN BIOPSY Right     face     Current medications include (per medical record):   Current Outpatient Prescriptions:      ALPRAZolam (XANAX) 1 MG tablet, Take 1 mg by mouth 2 (two) times a day as needed. , Disp: , Rfl:      aspirin 81 MG EC tablet, Take 1 tablet (81 mg total) by mouth daily., Disp: , Rfl: 0     atenolol (TENORMIN) 100 MG tablet, Take 100 mg by mouth daily., Disp: , Rfl:      cholecalciferol, vitamin D3, 1,000 unit tablet, Take 2,000 Units by mouth daily., Disp: , Rfl:      cyanocobalamin 500 MCG tablet, Take 500 mcg by mouth daily., Disp: , Rfl:      escitalopram oxalate (LEXAPRO) 20 MG tablet, Take 1.5 tablets (30 mg total) by mouth daily., Disp: 135 tablet, Rfl: 2     finasteride (PROSCAR) 5 mg tablet, Take 5 mg by mouth daily., Disp: , Rfl:      furosemide (LASIX) 20 MG tablet, Take 20 mg by mouth daily. , Disp: , Rfl:      gabapentin (NEURONTIN) 600 MG tablet, Take 1,200 mg by mouth bedtime., Disp: , Rfl:      HYDROcodone-acetaminophen (NORCO) 7.5-325 mg per tablet, Take 1 tablet by mouth every 4 (four) hours as needed. , Disp: , Rfl:      ipratropium-albuterol (COMBIVENT RESPIMAT)  mcg/actuation Mist inhaler, Inhale 1 puff 4 (four) times a day as needed., Disp: , Rfl:      isosorbide mononitrate (IMDUR) 30 MG 24 hr tablet, Take 1 tablet (30 mg total) by mouth daily., Disp: 90 tablet, Rfl: 0     losartan (COZAAR) 50 MG tablet, Take 50 mg by mouth daily., Disp: , Rfl:      mirtazapine (REMERON) 15 MG tablet, Take 2 tablets (30 mg total) by mouth at  "bedtime., Disp: 60 tablet, Rfl: 3     nitroglycerin (NITROSTAT) 0.4 MG SL tablet, Place 0.4 mg under the tongue every 5 (five) minutes as needed for chest pain., Disp: , Rfl:      pantoprazole (PROTONIX) 40 MG tablet, Take 40 mg by mouth every morning., Disp: , Rfl:      QUEtiapine (SEROQUEL) 25 MG tablet, Take 1 tablet (25 mg total) by mouth 4 (four) times a day., Disp: 360 tablet, Rfl: 1     QUEtiapine (SEROQUEL) 50 MG tablet, Take 1 tablet (50 mg total) by mouth at bedtime., Disp: 90 tablet, Rfl: 1     rivastigmine (EXELON) 9.5 mg/24 hr, Place 1 patch on the skin daily., Disp: 90 patch, Rfl: 3     tamsulosin (FLOMAX) 0.4 mg Cp24, Take 0.8 mg by mouth every evening. , Disp: , Rfl:      tiZANidine (ZANAFLEX) 4 MG tablet, Take 4 mg by mouth every 8 (eight) hours as needed (1-2)., Disp: , Rfl:      warfarin (COUMADIN) 4 MG tablet, Take 4 mg by mouth every evening., Disp: , Rfl: .    FAMILY MEDICAL HISTORY:   Family medical history is significant for:   Family History   Problem Relation Age of Onset     Heart attack Mother      Other Father      Abdominal Aneurysm     Heart attack Brother      Heart attack Brother      Hypertension Son      Hyperlipidemia Son      Family history of dementia or other neurologic conditions was denied.    INTERIM PSYCHIATRIC HISTORY:  Currently, he described his mood as up and down. He has significant anxiety for which he takes Xanax multiple times a week. His anxiety reported comes out of the blue. He noted that he has \"become introverted\" and feels \"nervous to leave\" his home. He also endorsed several recent stressors, including issues with his alcoholic son, he found his best friend dead in his bedroom 3 months ago after committing suicide. He also reported other deaths of close family members, including his brother who  during the [place] war, his nephew who also  serving in the , and his niece who  while hiking. He has participated in psychotherapy in the past " "but has no interest currently. He endorsed passive suicidal ideation, but denied current intent or plan. He stated that he never wants to become a burden on his family, and worries about what his future with DLB will look like. He cited his grandchildren as protective factors. He reported that his appetite is normal. His sleep is disrupted by pain and ruminative anxiety/worries. He usually falls asleep for three hours and then his sleep is disrupted for the remainder of the night. He recently got a \"cannabis card\" and plans to try cannabis oil to help his sleep. He also noted that his other medications have helped his sleep to some degree. He reported that he feels significantly sluggish during the day, particularly within the past four months. He typically takes a nap every day for  minutes. Symptoms of REM sleep behavior disorder or obstructive sleep apnea were denied.    With regard to substance abuse, Mr. Charles denied current drug or alcohol use. He has not consumed alcohol for over 40 years. He does have a history of chronic marijuana use.     INTERIM SOCIAL HISTORY:  For a full psychosocial history, please refer to the previous neuropsychological report dated 1/6/2014. The patient currently lives in his own home in [place] with his wife and his dog. He reported that he plans to move into his son's house soon, as his son is building an apartment for them.    SERVICES:   A review of medical records and interview of the patient contributed to the clinical formulation. I selected, integrated and interpreted the tests and generated this report.  A trained examiner administered and scored the neuropsychometric tests. For diagnostic and coding purposes, Mr. Charles has a history of dementia with Lewy bodies (DLB), vascular conditions, depression, anxiety, and bilateral pulmonary emboli. He was referred for an evaluation of major neurocognitive disorder. Total time for this evaluation was 7 hours, " including 1 unit of 76431, 3 units of 99927, and 3 units of 66955.    TESTS ADMINISTERED:   Wechsler Adult Intelligence Scale-IV (select subtests), Wide Range Achievement Test-4 (select subtests), Wechsler Memory Test-IV (select subtests), Brief Visuospatial Memory Test-Revised, [place] Verbal Learning Test-Short Form, Color Trailmaking Test,  FAS and Animal Fluency, [place] Naming Test-Short Form, Clock Drawing Test, Stroop Color and Word Test, [place] Card Sorting Test-1 deck, Steen Judgement of Line Orientation, Patient Health Questionnaire-9 and Geriatric Depression Scale-Short Form.    BEHAVIORAL OBSERVATIONS:   Mr. Charles arrived on time and accompanied by his wife to today's appointment. His wife did not stay for the appointment, however. He was appropriately dressed and groomed. He appeared alert and engaged. Gait and other motor activity appeared normal. No vision or hearing difficulties were observed. Conversational speech was of normal rate, volume, and prosody. No word-finding pauses or paraphasias were noted. His thought process appeared linear and goal-directed. No hallucinations or delusions were apparent. Judgment and insight appeared relatively intact. However, he made several mildly inappropriate comments toward the writer and the psychometrist. His mood was dysphoric and his affect was appropriately reactive. During testing, rapport was easily established and eye contact was appropriate. He was alert throughout. He was cooperative throughout the evaluation. He occasionally required repetition and clarification of test instructions, but eventually appeared to understand all task demands. Mr. Charles appeared adequately motivated and engaged easily during testing. Therefore, the following results are considered a valid estimation of his current cognitive abilities.    OPTIMAL PREMORBID INTELLECT:  Optimal premorbid intellectual abilities were estimated as falling in the average range based on  "Mr. Charles's educational and occupational histories and performance on tasks least likely to be affected by acquired brain dysfunction.    SUMMARY OF TEST RESULTS:  ATTENTION/PROCESSING SPEED. Performances on the WAIS-IV subtests sensitive to sustained auditory-verbal attention and concentration were in the average range. Specifically, he was able to recite up to 7 digits forward (average), up to 5 digits backward (average), and up to 6 digits in sequence (average). On a measure of psychomotor processing speed (WAIS-IV Symbol Search), his performance was in the borderline impaired range. On a test of complex concentration that requires speeded numeric sequencing (Color Trails A), the patient performed in the average range and without error. Similarly, on a more difficult version of that task that requires speeded sequencing/cognitive set-shifting (Color Trails B), performance was in the average range and without error. On the Stroop Color-Word Test, speeded word reading and color naming were both borderline impaired.      LANGUAGE PROCESSING. Language comprehension appeared intact. Verbal abstract reasoning was in the average range (WAIS-IV Similarities). The patient demonstrated average performance on the [place] Naming Test, a test of visual confrontation naming and semantic retrieval. Fluency for words beginning with a specified letter was in the low average range (FAS), while Animal Fluency was average. His writing sample was intact but mildly micrographic. He wrote \"I love my grandchildren.\"      VISUOSPATIAL/CONSTRUCTIONAL SKILLS. Nonverbal abstract reasoning was in the low average range (WAIS-IV Matrix Reasoning). Visuoconstruction with three-dimensional blocks was in the impaired range (WAIS-IV Block Design). Spatial judgment, as measured by Judgment of Line Orientation, was in the low average range. His copy of six simple geometric figures was intact (BVMT-R Copy Trial). On a clock drawing test, he was able " to draw a relatively well-formed [place] that was somewhat small. The numbers were adequately spaced around the clock face, and the clock hands converged nicely in the center.     LEARNING/MEMORY. The patient was fully oriented to personal and current information. On the WMS-IV Logical Memory subtest, immediate memory for paragraph-length stories was impaired while delayed memory was borderline impaired with an 81% retention rate. Delayed recognition of that same material was borderline impaired. He exhibited a fairly strong negative response bias on the recognition task. On a 9-item verbal list-learning task ([place] Verbal Learning Test-II - Short Form), the patient acquired up to 6 words (67%) of the word list by the 4th and final learning trial (raw score over trials = 4, 4, 6, 5). Total learning acquisition was in the low average range. Following a distractor task, the patient recalled 4 items, which was in the borderline impaired range. After a 10-minute delay, the patient again recalled 4 items, which was in the low average range. He benefited only slightly from semantic cues (5 items; average). He recognized 7/9 items (low average) and made 2 false positive errors (low average).     On a visual memory measure (BVMT-R), immediate recall of six simple geometric figures across three learning trials was in the borderline impaired range (raw score over trials = 4, 4, 6 out of 12). Delayed recall of the figures was also borderline impaired, with a 67% retention rate. Recognition discriminability of the figures was in the low average range, as he correctly identified 5/6 figures and made 0 false positive errors.      EXECUTIVE FUNCTIONS. Concept identification and the ability to generate alternative problem solving strategies was borderline impaired for age on the [place] Card Sorting Test. He completed only one category (borderline impaired) with a total of 23 errors, which is average. Eight of his errors were  perseverative (average) and there was only one failure to maintain set. Phonemic fluency was low average (FAS). On a test of inhibition and cognitive flexibility, (Stroop Color-Word), the patient's performance was in the borderline impaired range and two errors were recorded. No errors were made on a test of speeded sequencing or speeded set-shifting (Color Trails A and B). Verbal abstract reasoning was average, while nonverbal abstract reasoning was low average (WAIS-IV Similarities and Matrix Reasoning, respectively). On a clock drawing test, his performance was intact as he was able to accurately represent analog time.     MOOD. On a self-report measure of depression (Geriatric Depression Scale - Short Form), the patient endorsed 8 items. This suggests depressive symptoms in the mild range. On the Barry Anxiety Inventory, a self-report measure of anxiety, he obtained a score of 31,  placing him in the range of severe anxiety.    DIAGNOSTIC IMPRESSIONS & RECOMMENDATIONS:  Mr. Charles is a 67 y.o., right-handed,  male with an existing diagnosis of dementia with Lewy bodies (DLB), vascular conditions, depression, anxiety, and a complex medical history. He was referred for an updated neurocognitive evaluation by Tobi Earl MD, to monitor his cognitive functioning and to assist with differential diagnosis and care planning.     Current test results reveal moderately impaired complex visuoconstruction, along with borderline impaired processing speed, learning efficiency, and novel problem solving/concept identification. Of note, he also made mildly inappropriate comments toward the examiner throughout testing. With regard to learning and memory more specifically, the patient exhibits mild learning inefficiencies for both verbal and nonverbal materials, although he tends to recall most of what he initially learns after a time delay. This pattern of performance suggests subtle dysfunction of the frontal regions as  opposed to compromise of mesial temporal structures per se. It may also be that his slowed processing exacerbated his difficulties on learning tasks to some degree. All other cognitive domains are within normal limits, including attention/concentration, language processing, basic visuospatial/perceptual abilities, and all other executive functions (i.e., cognitive inhibition and abstract reasoning). With regard to psychological symptoms, the patient endorses severe anxiety symptoms and mild depressive symptoms on self-report questionnaires.    Compared to previous test performances from 2014, the patient has demonstrated significant decline in several aspects of visuospatial/perceptual abilities and verbal learning of a word list. His level of anxiety also notably worsened over time, per his responses on a self-report measure. In contrast, several aspects of his language processing, attention/concentration, and self-reported symptoms of depression have improved over time. All other cognitive domains remained generally stable.    Overall, the current findings are suggestive of subtle cerebral dysfunction primarily in the bilateral frontal/subcortical regions. He continues to meet criteria for multiple-domain, non-amnestic Mild Cognitive Impairment, likely due to an incipient Lewy body syndrome. Significant anxiety, mild depression, chronic sleep disturbance, and numerous cerebrovascular factors may also be contributory to some degree. While less likely, medication side effects cannot be fully ruled out as a possible contributor to his slowed processing speed. Along with DLB, psychological symptoms and worsened sleep quality may account for some of the fluctuations in his cognitive functioning over time.    Given the nature and extent of his cognitive difficulties, I would not expect this patient to have significant difficulties functioning in daily life. However, he may experience frustration while attempting to  learn novel information/tasks or perform tasks efficiently. He should allow himself extra time to complete activities and process information, and may benefit from other cognitive compensatory strategies (e.g., utilizing calendars, note pads, checklists, to-do lists, alarm reminders, etc.). Improving his mental health should also be a priority for this patient, as improvements in anxiety and depression may elicit improvements in his cognitive efficiency, sleep, and pain perception over time. I highly recommend re-establishing care with a psychotherapist, along with continued monitoring of his psychotropic medications. I do not have significant concern about his driving safety at this time, and his current independent living situation remains appropriate. This evaluation can now serve as a new baseline should neuropsychological follow-up be indicated in the future.    Mr. Charles has requested to receive the results of this evaluation from his referring provider at the time of an upcoming follow-up appointment; however, he was encouraged to schedule a formal feedback appointment with me after that appointment to discuss these results in further detail.     Thank you for allowing me to participate in Mr. Charles's care. Please contact me with any questions regarding the content of this report.        Patience Huerta PsyD, LP  Licensed Clinical Neuropsychologist    [facility]  Neuropsychology Section   Phone:  [phone]

## 2021-06-15 NOTE — PROGRESS NOTES
Persons accompanying you (the patient) today: wife jeramy    How have you been doing since we saw you last? Please note any concerns.  Tired all the time a little bit more forgetful    Please list any recent hospitalizations/surgeries/procedures you've had since we saw you last:  none    Have you had any falls since your last visit? No    Do you have any pain today? Yes: low back pain

## 2021-06-16 NOTE — PROGRESS NOTES
[facility] Progress Note    Assessment:    1.  Coronary artery disease with no recent significant anginal symptoms  2.  Moderate ischemic cardiomyopathy secondary to previous anterior myocardial infarction compensated  3.  Hyperlipidemia  4.  History of pulmonary embolism on chronic Coumadin therapy    Plan:    We will discontinue the patient's isosorbide mononitrate due to intermittent orthostatic lightheadedness.  Would otherwise continue the patient's current medical regiment.  This patient is stable from a cardiovascular standpoint for upcoming colonoscopy.  Would like to see Parveen in follow-up in 1 year with no interim cardiac testing ordered    An After Visit Summary was printed and given to the patient.    Subjective:    67-year-old male who presented with an acute anterior myocardial infarction 2014 with subsequent stenting to his left anterior descending artery.  The patient subsequently has had moderate ischemic cardiomyopathy with ejection fraction range of 35%.  He was last evaluated in November 2016 where his LAD stent was widely patent with a normal circumflex artery and only mild stenosis in his right coronary artery.  Patient is plagued with chronic atypical chest pain with an unstable pattern over last year he denies any exertional chest pain or symptoms of congestive heart failure    Problem List:    Patient Active Problem List   Diagnosis     Hypertension     Trigeminal neuralgia     GERD (gastroesophageal reflux disease)     BPH (benign prostatic hyperplasia)     Chest pain     Panic disorder without agoraphobia     History of pulmonary embolism- 2014, on warfarin     Lewy body dementia     CAD (coronary artery disease) s/p LAD stent     DNI (do not intubate)     Abnormal stress test         Current Outpatient Prescriptions   Medication Sig Dispense Refill     ALPRAZolam (XANAX) 1 MG tablet Take 1 mg by mouth 2 (two) times a day as needed.        aspirin 81 MG EC tablet Take 1  tablet (81 mg total) by mouth daily.  0     cholecalciferol, vitamin D3, 1,000 unit tablet Take 2,000 Units by mouth daily.       cyanocobalamin 500 MCG tablet Take 500 mcg by mouth daily.       escitalopram oxalate (LEXAPRO) 20 MG tablet Take 1.5 tablets (30 mg total) by mouth daily. 135 tablet 2     finasteride (PROSCAR) 5 mg tablet Take 5 mg by mouth daily.       furosemide (LASIX) 20 MG tablet Take 20 mg by mouth daily.        gabapentin (NEURONTIN) 600 MG tablet Take 1,200 mg by mouth bedtime.       HYDROcodone-acetaminophen (NORCO) 7.5-325 mg per tablet Take 1 tablet by mouth every 4 (four) hours as needed.        ipratropium-albuterol (COMBIVENT RESPIMAT)  mcg/actuation Mist inhaler Inhale 1 puff 4 (four) times a day as needed.       isosorbide mononitrate (IMDUR) 30 MG 24 hr tablet Take 1 tablet (30 mg total) by mouth daily. 90 tablet 0     losartan (COZAAR) 50 MG tablet Take 50 mg by mouth daily.       metoprolol tartrate (LOPRESSOR) 100 MG tablet Take 100 mg by mouth 2 (two) times a day.       mirtazapine (REMERON) 15 MG tablet Take 2 tablets (30 mg total) by mouth at bedtime. 60 tablet 3     nitroglycerin (NITROSTAT) 0.4 MG SL tablet Place 0.4 mg under the tongue every 5 (five) minutes as needed for chest pain.       pantoprazole (PROTONIX) 40 MG tablet Take 40 mg by mouth every morning.       QUEtiapine (SEROQUEL) 25 MG tablet Take 1 tablet (25 mg total) by mouth 4 (four) times a day. 360 tablet 1     QUEtiapine (SEROQUEL) 50 MG tablet Take 1 tablet (50 mg total) by mouth at bedtime. 90 tablet 1     rivastigmine (EXELON) 9.5 mg/24 hr Place 1 patch on the skin daily. 30 patch 6     tamsulosin (FLOMAX) 0.4 mg Cp24 Take 0.8 mg by mouth every evening.        tiZANidine (ZANAFLEX) 4 MG tablet Take 4 mg by mouth every 8 (eight) hours as needed (1-2).       warfarin (COUMADIN) 4 MG tablet Take 4 mg by mouth every evening.       atenolol (TENORMIN) 100 MG tablet Take 100 mg by mouth daily.       No current  facility-administered medications for this visit.        .  Past Surgical History:   Procedure Laterality Date     APPENDECTOMY       BACK SURGERY       CARDIAC CATHETERIZATION  11/21/2016    no change, no intervention     CARDIAC CATHETERIZATION  12/2013     CORONARY ANGIOPLASTY  12/2013    2 stents placed     ESOPHAGOGASTRODUODENOSCOPY N/A 10/30/2015    Procedure: ESOPHAGOGASTRODUODENOSCOPY using biopsy forceps;  Surgeon: Hamilton Sanchez MD;  Location: [facility];  Service:      IN APPENDECTOMY      Description: Appendectomy;  Recorded: 10/17/2011;     IN ARTHRODESIS ANT INTERBODY MIN DISCECTOMY,LUMBAR      Description: Lumbar Vertebral Fusion;  Recorded: 10/17/2011;     PROSTATE SURGERY      x2 per pt  for enlarged prostate     SKIN BIOPSY Right     face       .  Social History     Social History     Marital status:      Spouse name: N/A     Number of children: 2     Years of education: N/A     Occupational History     Not on file.     Social History Main Topics     Smoking status: Never Smoker     Smokeless tobacco: Never Used     Alcohol use No      Comment: No alcohol in 34 years (since 1980)     Drug use: No     Sexual activity: Not on file     Other Topics Concern     Not on file     Social History Narrative       .  Family History   Problem Relation Age of Onset     Heart attack Mother      Other Father      Abdominal Aneurysm     Heart attack Brother      Heart attack Brother      Hypertension Son      Hyperlipidemia Son      Review of Systems:  General: Night Sweats, Weight Loss  Eyes: WNL  Ears/Nose/Throat: WNL  Lungs: Cough, Shortness of Breath, Wheezing, Snoring  Heart: Chest Pain, Shortness of Breath with activity, Irregular Heartbeat, Fainting  Stomach: Constipation, Diarrhea, Heartburn, Nausea     Muscle/Joints: Joint Pain, Muscle Weakness, Muscle Pain  Skin: Poor Wound Healing, Rash  Nervous System: Daytime Sleepiness, Dizziness, Loss of Balance, Falls  Mental Health: Anxiety,  "Depression     Blood: Easy Bleeding, Easy Bruising    Objective:     /62 (Patient Site: Right Arm, Patient Position: Sitting, Cuff Size: Adult Large)  Pulse 60  Resp 18  Ht 6' 2\" (1.88 m)  Wt (!) 246 lb (111.6 kg)  BMI 31.58 kg/m2  (!) 246 lb (111.6 kg)   [unfilled]    Physical Exam:    GENERAL APPEARANCE: alert, no apparent distress  HEENT: no scleral icterus or xanthelasma  NECK: jugular venous pressure normal  CHEST: symmetric, the lungs were clear to auscultation  CARDIOVASCULAR: regular rhythm without murmur, click, or gallop; no carotid bruits  ABDOMEN: nondistended, nontender, bowel sounds present  EXTREMITIES: no cyanosis, clubbing or edema.    Cardiac Testing:    No further cardiac testing since his coronary angiography in November 2016      Lab Results:    LIPIDS:  Lab Results   Component Value Date    CHOL 236 (H) 08/03/2017    CHOL 258 (H) 08/12/2016    CHOL 210 (H) 06/24/2015     Lab Results   Component Value Date    HDL 28 (L) 08/03/2017    HDL 32 (L) 08/12/2016    HDL 31 (L) 06/24/2015     Lab Results   Component Value Date    LDLCALC  08/03/2017      Comment:      Invalid, Triglycerides >400    LDLCALC  08/12/2016      Comment:      Invalid, Triglycerides >400    LDLCALC  06/24/2015      Comment:      Invalid, Triglycerides >300     Lab Results   Component Value Date    TRIG 506 (H) 08/03/2017    TRIG 472 (H) 08/12/2016    TRIG 304 (H) 06/24/2015     No components found for: CHOLHDL    BMP:  Lab Results   Component Value Date    CREATININE 1.34 (H) 01/19/2018    BUN 14 01/19/2018     01/19/2018    K 4.2 01/19/2018     01/19/2018    CO2 30 01/19/2018         Sanjeev Rodriguez MD,F.A.C.C.  [facility]  [phone]                "

## 2021-06-18 NOTE — PROGRESS NOTES
Persons accompanying you (the patient) today: Wife Carolina    How have you been doing since we saw you last? Please note any concerns.  Pt is here for a f/u ML apt. Pt called on 1/24/2018 saying that the oral exelon was making him sick so he is going back to the patches.     Please list any recent hospitalizations/surgeries/procedures you've had since we saw you last:  None     Have you had any falls since your last visit? Yes: 2-3 pt believes     Do you have any pain today? Yes: Spine

## 2021-06-18 NOTE — PROGRESS NOTES
Assessment / Impression     1.  Dementia with Lewy bodies at an MCI level of impairment with documented progression per serial neuropsychometric testing  2.  Anxiety and depression currently under good control  3.  Other medical problems as noted past medical history    Plan:   1.  Continue present therapies  2.  Follow-up in 1 year or sooner if needed    Long conversation held with the patient and spouse in attendance.  This patient is believed to suffer from dementia with Lewy bodies continues to do well with current therapies.  We posit that the patient became symptomatic during a rather severe medical illness in 2012.  He has been essentially asymptomatic and clinically stable for a number of years leading to some speculation on the part of the patient that he may, in fact, not suffer from neurodegenerative illness.  Serial neuropsychometric testing however has revealed progression in visuospatial capabilities with last testing performed in December 2017.  Of note, the patient is reporting significant difficulty with olfactory function at this time again consistent with synucleinopathy.  I would suspect recurrence of clinical symptoms at some point in the future.  I did also discuss with the patient the option of pursuing further diagnostics if and when clinically appropriate.    Total time for evaluation 30 minutes with majority of time spent in counseling    Subjective:     HPI: Cain Charles is a 68 y.o. male with above-noted diagnoses who returns for follow-up.  The patient appears to be doing well and remains largely asymptomatic at this time by self-report and report with the spouse.  The patient is downsizing and will be moving in with his son in August as he and his wife Carolina have sold their home.  No new complaints are noted.          Review of Systems:          As above        Objective:     Vitals:    06/15/18 1450 06/15/18 1452   BP: 148/74 134/78   Pulse: 80 72       No results found for this  or any previous visit (from the past 24 hour(s)).    Physical Exam: Patient is alert pleasant and interactive at this time.  No difficulty with attentional function as noted during the course of conversation.  Affect is pleasant and mood congruent.  No observable EPS identified.

## 2021-06-20 NOTE — LETTER
Letter by Monisha Mercedes RN at      Author: Monisha Mercedes RN Service: -- Author Type: --    Filed:  Encounter Date: 4/10/2020 Status: (Other)         Cain Charles  [address]  [place] [address]             April 10, 2020         Dear Mr. Charles,    Below are the results from your recent visit:    Resulted Orders   Echo Complete   Result Value Ref Range    AV mean andrea 84.5 cm/s    AV mean gradient 3 mmHg    AV VTI 21.8 cm    AV peak andrea 110 cm/s    AO ascending 4.2 cm    LA size 4 cm    LVOT diam 2.1 cm    LVOT mean gradient 2 mmHg    LVOT peak VTI 16 cm    LVOT mean andrea 64.7 cm/s    LVOT peak andrea 82.5 cm/s    LVOT peak gradient 3 mmHg    MV E' lat andrea 7.03 cm/s    MV E' med andrea 7.88 cm/s    MV decel time 204 ms    MV peak A andrea 87.2 cm/s    MV peak E andrea 56.1 cm/s    BSA 2.38 m2    Hieght 74 in    Weight 3,840 lbs    AV area 2.5 cm2    AV DIM IND andrea 0.8     MV E/A Ratio 0.6     LVOT area 3.46 cm2    LVOT SV 55.4 cm3    AV peak gradient 4.8 mmHg    LV SVi 23.3 ml/m2    MV med E/e' ratio 7.1     MV lat E/e' ratio 8.0     Height 74.0 in    Weight 240 lbs    MV Avg E/e' Ratio 7.5 cm/s    AV DIM IND VTI 0.7     Echo LVEF calculated 46 55 - 75 %    LV volume systolic 86 21 - 61 cm3    LV systolic volume index 36.1 11 - 31 cm3/m2    LV volume diastolic 160 62 - 150 cm3    LV diastolic volume index 67.2 34 - 74 cm3/m2    Narrative    1. The left ventricle is normal in size. Left ventricular systolic   performance is mildly reduced. The ejection fraction is estimated to be   45%.   2. There is mild global reduction in left ventricular systolic   performance.   3. No significant valvular heart disease is identified on this study.   4. Probable normal right ventricular size and systolic performance though   right-sided structures are not clearly visualized on all views on this   study.     When compared to the prior real-time echocardiogram dated 9 December 2013,   there has been a mild  diminution in left ventricular systolic performance.     The test results show that your echocardiogram was stable. Please plan to discuss further at next appointment with Hong in the A-fib clinic on April 16th at 2:10 pm.     Notes recorded by Quique Bradley MD (Ted) on 4/6/2020 at 12:54 PM CDT    Stable results, no new orders       Please call with questions or contact us using MyChart.    Sincerely,    Monisha OKEEFE
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
## 2021-06-21 NOTE — PROGRESS NOTES
Persons accompanying you (the patient) today: Wife    How have you been doing since we saw you last? Please note any concerns.  Pt. Says he is doing okay but would like to discuss his concerns from his incident when he had to be hospitalized.    Please list any recent hospitalizations/surgeries/procedures you've had since we saw you last:  Pt. Says he was hospitalized about 3 weeks ago.    Have you had any falls since your last visit? No    Do you have any pain today? Yes

## 2021-06-21 NOTE — PROGRESS NOTES
Assessment / Impression   1.  Dementia with Lewy bodies  2.  Delirium exacerbated initially by changes of environment and interpersonal relationship difficulties but later exacerbated by medication noncompliance  3.  Other medical problems as noted      Plan:   1.  Continue present therapies  2.  Consider the role of additional environmental support interventions including senior campus options depending on patient's progress    A long conversation with the patient and wife Carolina in attendance.  This patient with dementia with Lewy bodies developed increasing levels of anxiety associated likely with delirium as a result of environmental and emotional stress factors related to selling a home this past summer and moving in to a son's home.  Likely experiencing intensifying symptoms of depression along with confusion, the patient suddenly discontinued all of his psychotropic therapies resulting in further deterioration in cognitive and functional capabilities eventually resulting in mental health hospitalization at [facility].  Following resumption of medications, the patient's condition has steadily improved.  At this point in time I would continue present therapies while observing the patient over time with consideration for further diagnostic evaluation and/or treatment depending on his clinical course moving forward.  The patient was admonished to continue continue compliance with therapies given his increasing fragility with advancing neurodegeneration.    Total time for this evaluation 45 minutes with the entirety of time spent in counseling.      Subjective:     HPI: Cain Charles is a 68 y.o. male with above-noted diagnoses who is seen in follow-up.  At the time of the patient's last visit he was noted to be doing well.  By late June, however, wife Carolina noted increasing levels of anxiety associated with projects including selling their home and moving to an apartment with him son's home.  Further  deterioration in the patient's condition this was associated with interpersonal relationship problems eventually resulting in the patient discontinuing all therapies including cholinesterase inhibitor therapy.  Worsening confusion and anxiety resulted in inpatient psychiatric hospitalization at [facility].  Hospitalization and resumption of scheduled medicines allow the patient to rapidly stabilized.  He eventually returned home with his wife.    At this point time the patient reports feeling quite well Carolina is concerned that he sleeps excessive amounts during the day or at least spends time isolated within his small apartment within his son's home.  It is unclear as to whether the patient is engaging in such activity because of the need to limit ambient stimulation but this is suspected.    No new complaints are noted.  Patient reports otherwise feeling well.          Review of Systems:          As above        Objective:     Vitals:    11/23/18 1455 11/23/18 1456 11/23/18 1458   BP: 129/78 112/77 109/72   Pulse: (!) 59 69 64       No results found for this or any previous visit (from the past 24 hour(s)).    Physical Exam: Neatly groomed and casually dressed, the patient seated for evaluation.  He reminds me much of what I recall from his prior visit in June.  Attentive, no significant evidence of confusion noted.  Mood appears to be significantly better

## 2021-06-22 NOTE — PROGRESS NOTES
"Psychology Progress Note    Date: 12/7/2018    Time length and type of treatment: 3123-6119, individual therapy    Necessity: This session is necessary to address depressed and anxious mood in the context of Lewy body dementia.  We initiate patient's treatment plan today.  He rates his anxiety on the SAMUEL-7 anxiety severity scale as 11 moderately severe anxiety.  Patient rates his depression on the PHQ-9 depression severity scale as 16 indicating severe depression.  Patient endorses several days in the last 2 weeks in which she has had thoughts that he would be better off dead.  He denies suicidal plan or intention.  He is not endorses passive ideation about not waking up in the morning.  Today we focus on the patient's depression treatment plan, specifically exploring thoughts and expectations of self and others, increasing meaningful activity, and cognitive messages that exacerbate depression.  The reader is invited to review the patient's full treatment plan in the Media section of the patient's Epic medical record.    Intervention: Patient reports doing a little better.  He indicates continued frustration as the apartment is still not ready for his wife and him.  He hopes to move next week.  He indicates he is looking forward to moving into the apartment because it is \"beautiful\".  He also reports that his son purchased a van so that it is easier to have family outings.  The patient hopes in the spring or early summer that the family will go on a trip to the [facility].    Patient reports making friends with 2 people when he was on the psychiatric unit and visited them last week to play cribbage.  He anticipates that this will be a consistent activity.    Patient expresses sadness and disappointment at ongoing disrupted relationship with his son who lives up [place].  However, when he visited he reported that his grandchildren did spend time with him.  Patient hopes to get his dog after they move into the " apartment and get settled.    We discussed the patient's sense of loss at selling his home and how that disrupted the patient's mood.  Patient acknowledges having an argument with his wife last night.  However, he reports that he now cannot remember what the argument was about.      This writer utilized motivational interviewing, active listening, reassurance and support in the context of cognitive behavioral therapy and ACT therapy to address the above.      Mental Status: Orientation to person, place, and time is in tact.  Recent and remote memory, attention, concentration, language, and fund of knowledge are adequate for this session.  Mood appears stable with pleasant affect.  No indication of suicidal ideation, plan, or intent.  No indication of homicidal ideation, plan or intent.    Progress: Patient feels that he is doing somewhat better although still struggling with the transition to a new home.  Progress is good with maintaining stable mood overall without impulsive behavior.    Plan:  Patient will return in 3-4 weeks where we will continue with cognitive behavioral therapy to maintain stable mood and navigation of this transition.  Patient did not want to schedule a follow-up appointment today.  He indicated that his wife would need to schedule the appointment as she is in command of all their various doctor appointments.  Estimated duration of treatment is 3 individual therapy sessions (64228) at 2-3-week intervals.   Treatment is expected to be completed by 3/1/2019.  Further treatment is warranted by recurrence of severe depression with suicidal ideation and behavioral disturbance.  Patient has a history of impulsive behavior and inconsistent cooperation with taking medication.  Patient would benefit from further services to solidify mood and behavioral stability.      Diagnosis: Major depressive disorder, recurrent, severe without psychotic thinking

## 2021-06-24 NOTE — TELEPHONE ENCOUNTER
Parveen called and said he is having a lot of muscle spasms. Wondering if it could be from medications?

## 2021-06-24 NOTE — TELEPHONE ENCOUNTER
Per Dr. Earl take zanaflex prn it is already ordered for patient. Notified patient and he has taken it a few times but he will take it now when he gets the bad spasms.

## 2021-06-28 NOTE — PROGRESS NOTES
"Progress Notes by Quique Bradley MD (Ted) at 4/1/2020  2:30 PM     Author: Quique Bradley MD (Ted) Service: -- Author Type: Physician    Filed: 4/1/2020  2:53 PM Encounter Date: 4/1/2020 Status: Signed    : Quique Bradley MD (Ted) (Physician)           The patient has been notified of following:     \"This telephone visit will be conducted via a call between you and your physician/provider. We have found that certain health care needs can be provided without the need for a physical exam.  This service lets us provide the care you need with a phone conversation.  If a prescription is necessary we can send it directly to your pharmacy.  If lab work is needed we can place an order for that and you can then stop by our lab to have the test done at a later time. If during the course of the call the physician/provider feels a telephone visit is not appropriate, you will not be charged for this service.\" Verbal consent has been obtained for this service by care team member:         HEART CARE PHONE ENCOUNTER        The patient has chosen to have the visit conducted as a telephone visit, to reduce risk of exposure given the current status of Coronavirus in our community. This telephone visit is being conducted via a call between the patient and physician/provider. Health care needs are being provided without a physical exam.     Assessment/Recommendations   Assessment:    Coronary artery disease with remote history of anterior myocardial infarction and LAD stenting  Ischemic cardiomyopathy with moderately depressed LV systolic function  Acute on chronic systolic heart failure  Frequent PVCs    Plan:  Increase furosemide to 40 mg twice a day to relieve fluid overload  Echocardiogram to reassess LV systolic function  Return to clinic in 1 week to reassess the volume status and check electrolytes      Follow Up Plan: Follow up in 1 week with CHF nurse practitioner or available " cardiologist  I have reviewed the note as documented.  This accurately captures the substance of my conversation with the patient.    Total time of call between patient and provider was 20 minutes   Start Time: 2:30 PM  Stop Time: 2:50 PM       History of Present Illness/Subjective    Cain Charles is a 70 y.o. male who is being evaluated via a billable telephone visit.    He complains of recent weight gain and worsening shortness of breath.  He states that he is about 20 pounds heavier now than he used to be.  His legs are more swollen as well.  He gets short of breath with physical activities.  He denies exertional chest pains.  He has been noticing frequent heart skipping.  He denies prolonged heart racing or syncope.  He underwent Holter monitor at the office of his primary physician.  Frequent PVCs were noted.  There was no significant bradycardia.  There was no runs of ventricular tachycardia.    I have reviewed and updated the patient's Past Medical History, Social History, Family History and Medication List.     Physical Examination not performed given phone encounter Review of Systems                                                Medical History  Surgical History Family History Social History   Past Medical History:   Diagnosis Date     Anxiety      BPH (benign prostatic hyperplasia)      Cancer (H)     Basal Cell  (on face)     Chest pain 06/18/2016     Chronic pain     back     Coronary artery disease     Hx of angio with (2) stents, lad stent     DNI (do not intubate)      GERD (gastroesophageal reflux disease)      Hypertension      Kidney stone      Lewy body disease (H)      Obesity      Opioid abuse (H)      Panic disorder      Pulmonary emboli (H)      Trigeminal neuralgia     Past Surgical History:   Procedure Laterality Date     APPENDECTOMY       BACK SURGERY       CARDIAC CATHETERIZATION  11/21/2016    no change, no intervention     CARDIAC CATHETERIZATION  12/2013     CORONARY ANGIOPLASTY   12/2013    2 stents placed     ESOPHAGOGASTRODUODENOSCOPY N/A 10/30/2015    Procedure: ESOPHAGOGASTRODUODENOSCOPY using biopsy forceps;  Surgeon: Hamilton Sanchez MD;  Location: [facility];  Service:      SC APPENDECTOMY      Description: Appendectomy;  Recorded: 10/17/2011;     SC ARTHRODESIS ANT INTERBODY MIN DISCECTOMY,LUMBAR      Description: Lumbar Vertebral Fusion;  Recorded: 10/17/2011;     PROSTATE SURGERY      x2 per pt  for enlarged prostate     SKIN BIOPSY Right     face    Family History   Problem Relation Age of Onset     Heart attack Mother      Other Father         Abdominal Aneurysm     Heart attack Brother      Heart attack Brother      Hypertension Son      Hyperlipidemia Son     Social History     Socioeconomic History     Marital status:      Spouse name: Not on file     Number of children: 2     Years of education: Not on file     Highest education level: Not on file   Occupational History     Not on file   Social Needs     Financial resource strain: Not on file     Food insecurity     Worry: Not on file     Inability: Not on file     Transportation needs     Medical: Not on file     Non-medical: Not on file   Tobacco Use     Smoking status: Never Smoker     Smokeless tobacco: Never Used   Substance and Sexual Activity     Alcohol use: No     Comment: No alcohol in 34 years (since 1980)     Drug use: No     Comment: medical marijuana.      Sexual activity: Not on file   Lifestyle     Physical activity     Days per week: Not on file     Minutes per session: Not on file     Stress: Not on file   Relationships     Social connections     Talks on phone: Not on file     Gets together: Not on file     Attends Jewish service: Not on file     Active member of club or organization: Not on file     Attends meetings of clubs or organizations: Not on file     Relationship status: Not on file     Intimate partner violence     Fear of current or ex partner: Not on file     Emotionally abused:  Not on file     Physically abused: Not on file     Forced sexual activity: Not on file   Other Topics Concern     Not on file   Social History Narrative    Patient is prescribed medical marijuana. Lives with his wife.          Medications  Allergies   Current Outpatient Medications   Medication Sig Dispense Refill     aspirin 81 MG EC tablet Take 1 tablet (81 mg total) by mouth daily.  0     atenolol (TENORMIN) 100 MG tablet Take 100 mg by mouth daily.       cholecalciferol, vitamin D3, 1,000 unit tablet Take 2,000 Units by mouth daily.       escitalopram oxalate (LEXAPRO) 20 MG tablet Take 1.5 tablets (30 mg total) by mouth daily. 135 tablet 2     finasteride (PROSCAR) 5 mg tablet Take 5 mg by mouth daily.       gabapentin (NEURONTIN) 600 MG tablet Take 600 mg by mouth every morning.       HYDROcodone-acetaminophen (NORCO) 7.5-325 mg per tablet TK 1 T PO Q 4 TO 6 H PRN P . MAX 5 TS IN 24 H  0     losartan (COZAAR) 50 MG tablet Take 50 mg by mouth daily.       mirtazapine (REMERON) 30 MG tablet Take 1 tablet (30 mg total) by mouth at bedtime. 90 tablet 6     ondansetron (ZOFRAN) 8 MG tablet Take 8 mg by mouth every 8 (eight) hours as needed for nausea.       pantoprazole (PROTONIX) 40 MG tablet Take 40 mg by mouth every morning.       QUEtiapine (SEROQUEL) 25 MG tablet Take 1 tablet (25 mg total) by mouth 3 (three) times a day as needed. 270 tablet 1     QUEtiapine (SEROQUEL) 50 MG tablet Take 1 tablet (50 mg total) by mouth at bedtime. 90 tablet 3     rivastigmine (EXELON) 9.5 mg/24 hr Place 1 patch on the skin daily. 90 patch 3     tiZANidine (ZANAFLEX) 4 MG tablet Take 4-8 mg by mouth every 8 (eight) hours as needed (spasms).        venlafaxine (EFFEXOR XR) 37.5 MG 24 hr capsule Take 1 capsule (37.5 mg total) by mouth daily. Take with 75mg cap (112.5mg) (Patient taking differently: Take 37.5 mg by mouth daily. Take 37.5 mg for 5 days      ) 30 capsule 6     warfarin (COUMADIN/JANTOVEN) 4 MG tablet Take 4 mg by  "mouth once.  3     ALPRAZolam (XANAX) 0.5 MG tablet Take 1 tablet (0.5 mg total) by mouth 3 (three) times a day as needed for anxiety. 60 tablet 0     furosemide (LASIX) 40 MG tablet Take 1 tablet (40 mg total) by mouth 2 (two) times a day at 9am and 6pm. 60 tablet 5     gabapentin (NEURONTIN) 600 MG tablet Take 1,200 mg by mouth at bedtime.              melatonin 3 mg Tab tablet Take 1 tablet (3 mg total) by mouth at bedtime as needed.  0     nitroglycerin (NITROSTAT) 0.4 MG SL tablet Place 0.4 mg under the tongue every 5 (five) minutes as needed for chest pain.       tamsulosin (FLOMAX) 0.4 mg Cp24 Take 0.8 mg by mouth every evening.        No current facility-administered medications for this visit.     Allergies   Allergen Reactions     Atorvastatin Other (See Comments)     Muscle ache     Compazine [Prochlorperazine] Other (See Comments)     Agitated, \"crazy\"     Galantamine Unknown     Penicillins Hives     Reaction was a long time ago     Simvastatin Myalgia     Sulfa (Sulfonamide Antibiotics) Hives     Reaction was a long time ago     Tetracyclines Unknown         Lab Results    Chemistry/lipid CBC Cardiac Enzymes/BNP/TSH/INR   Lab Results   Component Value Date    CHOL 262 (H) 01/23/2020    HDL 33 (L) 01/23/2020    LDLCALC  01/23/2020      Comment:      Invalid, Triglycerides >400    TRIG 510 (H) 01/23/2020    CREATININE 1.00 01/23/2020    BUN 14 01/23/2020    K 3.8 01/23/2020     01/23/2020     01/23/2020    CO2 30 01/23/2020    Lab Results   Component Value Date    WBC 7.9 10/29/2018    HGB 14.1 10/29/2018    HCT 44.0 10/29/2018    MCV 87 10/29/2018     10/29/2018    Lab Results   Component Value Date    CKTOTAL 47 10/02/2014    CKMB <1 01/08/2014    TROPONINI <0.01 10/29/2018    BNP 66 (H) 04/05/2014    TSH 0.27 (L) 10/29/2018    INR 1.91 (H) 11/08/2018        Quique Bradley (Ted)                                              "

## 2021-06-28 NOTE — PROGRESS NOTES
"Progress Notes by Chelsey Aguirre CNP at 4/8/2020  1:30 PM     Author: Chelsey Aguirre CNP Service: -- Author Type: Nurse Practitioner    Filed: 4/8/2020  2:08 PM Encounter Date: 4/8/2020 Status: Signed    : Chelsey Aguirre CNP (Nurse Practitioner)       The patient has been notified of following:     \"This telephone visit will be conducted via a call between you and your physician/provider. We have found that certain health care needs can be provided without the need for a physical exam.  This service lets us provide the care you need with a phone conversation.  If a prescription is necessary we can send it directly to your pharmacy.  If lab work is needed we can place an order for that and you can then stop by our lab to have the test done at a later time. If during the course of the call the physician/provider feels a telephone visit is not appropriate, you will not be charged for this service.\"         HEART CARE PHONE ENCOUNTER        Appointment is being conducted as a telephone visit, to reduce risk of exposure given the current status of Coronovirus in our community. This telephone visit is being conducted via a call between the patient and physician/provider. Health care needs are being provided without a physical exam.     Assessment/Recommendations   Assessment & PLAN:     Diagnoses and all orders for this visit:    PVC's (premature ventricular contractions) at 14% are not likely causing decrease in EF.  Asymptomatic on questioning.  Acute on chronic systolic heart failure (H) and having signs and symptoms of decompensated heart failure.  I believe he is doing better with increase in diuretic but still has significant fluid retention from what patient describes over the phone.  Therefore, to increase furosemide to 80 mg in the morning and stick with 40 mg in the afternoon.  He has normal creatinine and potassium on the low side of normal at last check.  Due to cramps and increasing " diuretic will add potassium 20 mEq orally every day.  I described foods that are high in potassium but he does not like many of these.  He has no teeth so  this limits some of the food choices.  -     furosemide (LASIX) 40 MG tablet; Take 2 tabs orally in am and 1 tab in afternoon.  Dispense: 180 tablet; Refill: 1  -     potassium chloride (K-DUR,KLOR-CON) 20 MEQ tablet; Take 1 tablet (20 mEq total) by mouth daily. For now  Dispense: 60 tablet; Refill: 0      Follow up in clinic with me in 1 week.  Will need BNP and BMP next week      Total time of call between patient and provider was 20 minutes .  Start time 1330 and end time 1350.       History of Present Illness/Subjective    Cain Charles is a 70 y.o. male who is being evaluated via a billable telephone visit.      At the end of the visit Parveen tells me he has Lewy body dementia.  He requests that his wife be with him in the visit.  I discussed that we will put her on the phone and she can participate in clinic visit next week.  His wife was on the phone today with this visit.  Parveen did all the talking.  He appears to be a good historian.  Parveen was scheduled as a telephone visit not a in clinic visit.  Parveen states that he is short of breath walking room to room.  He has not noticed a big difference since diuretic increased last week.  He does not do stairs every day but it is very hard for him but primarily due to spine issues.  He has fluid in his ankles and feet.  He tells me he could not bend his toes last week and he can this week.  He does not have a scale.  He is noticing increased urination after taking diuretics.  His weight was down 8 pounds when he saw his primary physician on Friday.  He did not have lab work.  He complains of some leg and toe cramps.  He denies any PND.  He sleeps on 4-5 pillows in the bed.  This is unchanged.  He reports that his swelling in his legs is some better.  He does not want to be hospitalized.  I have reviewed and updated  the patient's Past Medical History, Social History, Family History and Medication List.   Cardiographics reviewed in prep for this visit:  January 2020 24-hour Holter shows sinus rhythm with first-degree AV block.  Average heart rate 61.  Heart rate range 51-1 2 0.  14% of heartbeats were PVCs.  Frequent bigeminy and trigeminy.  I believe PVCs are mostly unifocal, but not clear to me from report.  April 3, 2020 echo shows EF 45% and mild decrease.  No significant valve disease.  Probably normal RV systolic function.  Echo reviewed with patient and his wife.  Physical Examination not perform given phone encounter Review of Systems                see above for compliants                                Medical History  Surgical History Family History Social History   Past Medical History:   Diagnosis Date   ? Anxiety    ? BPH (benign prostatic hyperplasia)    ? Cancer (H)     Basal Cell  (on face)   ? Chest pain 06/18/2016   ? Chronic pain     back   ? Coronary artery disease     Hx of angio with (2) stents, lad stent   ? DNI (do not intubate)    ? GERD (gastroesophageal reflux disease)    ? Hypertension    ? Kidney stone    ? Lewy body disease (H)    ? Obesity    ? Opioid abuse (H)    ? Panic disorder    ? Pulmonary emboli (H)    ? Trigeminal neuralgia     Past Surgical History:   Procedure Laterality Date   ? APPENDECTOMY     ? BACK SURGERY     ? CARDIAC CATHETERIZATION  11/21/2016    no change, no intervention   ? CARDIAC CATHETERIZATION  12/2013   ? CORONARY ANGIOPLASTY  12/2013    2 stents placed   ? ESOPHAGOGASTRODUODENOSCOPY N/A 10/30/2015    Procedure: ESOPHAGOGASTRODUODENOSCOPY using biopsy forceps;  Surgeon: Hamilton Sanchez MD;  Location: [facility];  Service:    ? WA APPENDECTOMY      Description: Appendectomy;  Recorded: 10/17/2011;   ? [place] ARTHRODESIS ANT INTERBODY MIN DISCECTOMY,LUMBAR      Description: Lumbar Vertebral Fusion;  Recorded: 10/17/2011;   ? PROSTATE SURGERY      x2 per pt  for enlarged  prostate   ? SKIN BIOPSY Right     face    Family History   Problem Relation Age of Onset   ? Heart attack Mother    ? Other Father         Abdominal Aneurysm   ? Heart attack Brother    ? Heart attack Brother    ? Hypertension Son    ? Hyperlipidemia Son     Social History     Socioeconomic History   ? Marital status:      Spouse name: Not on file   ? Number of children: 2   ? Years of education: Not on file   ? Highest education level: Not on file   Occupational History   ? Not on file   Social Needs   ? Financial resource strain: Not on file   ? Food insecurity     Worry: Not on file     Inability: Not on file   ? Transportation needs     Medical: Not on file     Non-medical: Not on file   Tobacco Use   ? Smoking status: Never Smoker   ? Smokeless tobacco: Never Used   Substance and Sexual Activity   ? Alcohol use: No     Comment: No alcohol in 34 years (since 1980)   ? Drug use: No     Comment: medical marijuana.    ? Sexual activity: Not on file   Lifestyle   ? Physical activity     Days per week: Not on file     Minutes per session: Not on file   ? Stress: Not on file   Relationships   ? Social connections     Talks on phone: Not on file     Gets together: Not on file     Attends Jew service: Not on file     Active member of club or organization: Not on file     Attends meetings of clubs or organizations: Not on file     Relationship status: Not on file   ? Intimate partner violence     Fear of current or ex partner: Not on file     Emotionally abused: Not on file     Physically abused: Not on file     Forced sexual activity: Not on file   Other Topics Concern   ? Not on file   Social History Narrative    Patient is prescribed medical marijuana. Lives with his wife.          Medications  Allergies   Current Outpatient Medications   Medication Sig Dispense Refill   ? ALPRAZolam (XANAX) 0.5 MG tablet Take 1 tablet (0.5 mg total) by mouth 3 (three) times a day as needed for anxiety. 60 tablet 0   ?  aspirin 81 MG EC tablet Take 1 tablet (81 mg total) by mouth daily.  0   ? atenolol (TENORMIN) 100 MG tablet Take 50 mg by mouth daily.      ? cholecalciferol, vitamin D3, 1,000 unit tablet Take 2,000 Units by mouth daily.     ? escitalopram oxalate (LEXAPRO) 20 MG tablet Take 1.5 tablets (30 mg total) by mouth daily. 135 tablet 2   ? finasteride (PROSCAR) 5 mg tablet Take 5 mg by mouth daily.     ? furosemide (LASIX) 40 MG tablet Take 2 tabs orally in am and 1 tab in afternoon. 180 tablet 1   ? gabapentin (NEURONTIN) 600 MG tablet Take 1,200 mg by mouth at bedtime.            ? gabapentin (NEURONTIN) 600 MG tablet Take 600 mg by mouth every morning.     ? HYDROcodone-acetaminophen (NORCO) 7.5-325 mg per tablet TK 1 T PO Q 4 TO 6 H PRN P . MAX 5 TS IN 24 H  0   ? losartan (COZAAR) 50 MG tablet Take 100 mg by mouth daily.      ? mirtazapine (REMERON) 30 MG tablet Take 1 tablet (30 mg total) by mouth at bedtime. 90 tablet 6   ? nitroglycerin (NITROSTAT) 0.4 MG SL tablet Place 0.4 mg under the tongue every 5 (five) minutes as needed for chest pain.     ? ondansetron (ZOFRAN) 8 MG tablet Take 8 mg by mouth every 8 (eight) hours as needed for nausea.     ? pantoprazole (PROTONIX) 40 MG tablet Take 40 mg by mouth every morning.     ? QUEtiapine (SEROQUEL) 25 MG tablet Take 1 tablet (25 mg total) by mouth 3 (three) times a day as needed. (Patient taking differently: Take 25 mg by mouth 2 (two) times a day. ) 270 tablet 1   ? QUEtiapine (SEROQUEL) 50 MG tablet Take 1 tablet (50 mg total) by mouth at bedtime. 90 tablet 3   ? rivastigmine (EXELON) 9.5 mg/24 hr Place 1 patch on the skin daily. 90 patch 3   ? tamsulosin (FLOMAX) 0.4 mg Cp24 Take 0.8 mg by mouth every evening.      ? tiZANidine (ZANAFLEX) 4 MG tablet Take 4-8 mg by mouth every 8 (eight) hours as needed (spasms).      ? warfarin (COUMADIN/JANTOVEN) 4 MG tablet Take 4 mg by mouth once.  3   ? potassium chloride (K-DUR,KLOR-CON) 20 MEQ tablet Take 1 tablet (20 mEq  "total) by mouth daily. For now 60 tablet 0   ? venlafaxine (EFFEXOR XR) 37.5 MG 24 hr capsule Take 1 capsule (37.5 mg total) by mouth daily. Take with 75mg cap (112.5mg) (Patient taking differently: Take 37.5 mg by mouth daily. Take 37.5 mg for 5 days      ) 30 capsule 6     No current facility-administered medications for this visit.     Allergies   Allergen Reactions   ? Atorvastatin Other (See Comments)     Muscle ache   ? Compazine [Prochlorperazine] Other (See Comments)     Agitated, \"crazy\"   ? Galantamine Unknown   ? Penicillins Hives     Reaction was a long time ago   ? Simvastatin Myalgia   ? Sulfa (Sulfonamide Antibiotics) Hives     Reaction was a long time ago   ? Tetracyclines Unknown         Lab Results    Chemistry/lipid CBC Cardiac Enzymes/BNP/TSH/INR   Lab Results   Component Value Date    CHOL 262 (H) 01/23/2020    HDL 33 (L) 01/23/2020    LDLCALC  01/23/2020      Comment:      Invalid, Triglycerides >400    TRIG 510 (H) 01/23/2020    CREATININE 1.00 01/23/2020    BUN 14 01/23/2020    K 3.8 01/23/2020     01/23/2020     01/23/2020    CO2 30 01/23/2020    Lab Results   Component Value Date    WBC 7.9 10/29/2018    HGB 14.1 10/29/2018    HCT 44.0 10/29/2018    MCV 87 10/29/2018     10/29/2018    Lab Results   Component Value Date    CKTOTAL 47 10/02/2014    CKMB <1 01/08/2014    TROPONINI <0.01 10/29/2018    BNP 66 (H) 04/05/2014    TSH 0.27 (L) 10/29/2018    INR 1.91 (H) 11/08/2018        Chelsey Aguirre                                              "

## 2021-06-29 NOTE — PROGRESS NOTES
Progress Notes by Chelsey Aguirre CNP at 4/16/2020  2:10 PM     Author: Chelsey Aguirre CNP Service: -- Author Type: Nurse Practitioner    Filed: 4/16/2020  3:15 PM Encounter Date: 4/16/2020 Status: Signed    : Chelsey Aguirre CNP (Nurse Practitioner)            Thank you, Dr. Sharp, for asking the [facility] team to see . Cain Charles to evaluate dyspnea on exertion and heart failure.    Assessment/Recommendations     Assessment/Plan:    Diagnoses and all orders for this visit:    Acute on chronic systolic heart failure (H) and complaining of fluid in his legs but on physical exam none appreciated.  Complaining of significant dyspnea on exertion with walking room to room.  Crackles in bilateral bases.  Due to Lewy body dementia he did not remember he was supposed to increase his diuretic and till he called in yesterday and reviewed med instructions.  Therefore he has been on increased diuretic dose only since yesterday.  Due to significant shortness of breath and crackles to continue furosemide 40 mg 2 tabs in the morning and 1 tab in the afternoon.  To continue potassium chloride 20 mEq 1 tab every day.  He had salt to a lot of food and discussed with Parveen and his wife he needs to not add any salt to his food and eat low-salt diet.  Discussed how high salt intake works against his diuretic.  He has adequate fluid intake from what Parveen describes.  BARNES is more chronic and I see no symptoms or signs of decompensated heart failure.  BNP and BMP today.  After the visit I reviewed HPI and questioned his wife, Carolina by phone.  I also reviewed plan.  Questions answered.  -     BNP(B-type Natriuretic Peptide)  -     Basic Metabolic Panel    Essential hypertension and well-controlled.    Ischemic cardiomyopathy and last EF 45% on recent echo.    Follow up with me in 1 week by phone visit.  To do daily weights and gave him weight log to keep track of symptoms and his weight.      History of Present Illness/Subjective     Cain Charles is a very pleasant 70 y.o. male who comes in today for cardiology follow-up for heart failure.  Cain Charles has a known history of remote history of anterior MI and stenting of LAD.  He has ischemic cardiomyopathy with mild to moderate cardiomyopathy and now acute on chronic systolic heart failure.  His history is also significant for more frequent PVCs and has Lewy body dementia which was diagnosed 8 years ago and told that he 5-year life expectancy.  He lives with his wife.  She does his medications.  Agnieszka is complaining that his feet are very painful especially the right foot.  He complains of limited motion in right great toe.  He complains of feet and legs being swollen.  He describes pain as tingling and numbness.  He has history of peripheral neuropathy per patient.    He is complaining of shortness of breath with walking room to room.  He does not do stairs very often.  His gets very short of breath with stairs.  He complains of more fatigue.  He  slept 20 hours yesterday.  I was questioning how he could be taking diuretic.  He tells me he just gets up and then goes right back to bed when he has to void.  He denies PND.  He is not very active.  His wife tells me that he has fatigue and sleeps a lot off and on over the last year.  It is unclear to her that this is any worse now.  She is concerned because he is complaining of more shortness of breath over the last few months and unclear if it is worse over the last few weeks.  agnieszka got a scale 2 days ago and his weight has been 240 both days.  Cardiographics (reviewed):  January 2020 24-hour Holter shows sinus rhythm with first-degree AV block.  Average heart rate 61.  Heart rate range 51-1 2 0.  14% of heartbeats were PVCs and frequently seen in bigeminy and trigeminy.  Results for orders placed in visit on 04/01/20   Echo Complete [ECH10] 04/03/2020    Narrative 1. The left ventricle is  normal in size. Left ventricular systolic   performance is mildly reduced. The ejection fraction is estimated to be   45%.   2. There is mild global reduction in left ventricular systolic   performance.   3. No significant valvular heart disease is identified on this study.   4. Probable normal right ventricular size and systolic performance though   right-sided structures are not clearly visualized on all views on this   study.     When compared to the prior real-time echocardiogram dated 9 December 2013,   there has been a mild diminution in left ventricular systolic performance.     Results for orders placed during the hospital encounter of 11/21/16   CV Coronary Angio Possible PCI [CATH50] 11/21/2016    Narrative  Procedure Summary   - Chronic chest pain. Large anterior and apical MI in 2013   with subsequent stress tests showing apical infarct and mixed   distal inferior infarct ischemia. Recurrent chest pain with   exertion.   - 4 Fr right radial access   - Mild LAD and diagonal disease with patent stents. Small   ulcerated plaque in proximal LAD unchanged.   - No circumflex disease appreciated   - Mild proximal RCA ulcerated plaque, stable on serial   angiograms. FFR across this lesion was not flow limiting with   adenosine at 0.96.   - EF was depressed at 33% with distal anterior and apical   akinesis with involvement of the distal inferior wall as well.   - LV EDP elevated at 23mmHg pre-ventriculogram and 30 mmHg   post   - Deep T wave inversions were noted with contrast injections   consistent with microvascular dysfunction              Comments:Chronic mild plaque ulceration. Given stress test positive in    this distribution FFR was done and was not flow limiting.           Results for orders placed in visit on 11/07/16   NM Pharmacologic Stress Test    Narrative   FINDINGS: The Lexiscan stress images demonstrate severely reduced tracer   uptake in an medium sized area of the inferior and apical wall.   The   resting images demonstrate mildly reduced tracer uptake in a small area of   the apical wall.  There is normal left ventricular   size. The gated images reveal moderately reduced global systolic   performance. The measured left ventricular ejection fraction is 38%.     CONCLUSION:   1. Lexiscan stress nuclear study is abnormal.  2. There is a medium sized severe intensity partially reversible defect in   the inferior and apical wall representing an area of inducible myocardial   ischemia with a small area of infarction in the apical wall.  3. Moderately reduced left ventricular systolic function with an ejection   fraction of 38%,     COMMENTS:     The patient is at intermediate risk of future cardiac ischemic events   based on perfusion imaging.              Cardiac testing personally reviewed:    Results for orders placed or performed during the hospital encounter of 11/21/16   ECG 12 lead MUSE   Result Value Ref Range    SYSTOLIC BLOOD PRESSURE  mmHg    DIASTOLIC BLOOD PRESSURE  mmHg    VENTRICULAR RATE 64 BPM    ATRIAL RATE 64 BPM    P-R INTERVAL 250 ms    QRS DURATION 84 ms    Q-T INTERVAL 414 ms    QTC CALCULATION (BEZET) 427 ms    P Axis 33 degrees    R AXIS -32 degrees    T AXIS 14 degrees    MUSE DIAGNOSIS       Sinus rhythm with 1st degree A-V block  Left axis deviation  Low voltage QRS  Possible Inferior infarct (cited on or before 27-APR-2016)  Anterior infarct (cited on or before 27-APR-2016)  Abnormal ECG  When compared with ECG of 07-NOV-2016 14:40,  Nonspecific T wave abnormality has replaced inverted T waves in Lateral leads  Confirmed by SURINDER KIMBALL, TIANA LOC:SJ ([id number]) on 11/21/2016 8:59:17 AM            Problem List:  Patient Active Problem List   Diagnosis   ? Essential hypertension   ? Trigeminal neuralgia   ? GERD (gastroesophageal reflux disease)   ? BPH (benign prostatic hyperplasia)   ? Chest pain   ? Panic disorder without agoraphobia   ? History of pulmonary embolism- 2014, on  warfarin   ? Lewy body dementia (H)   ? CAD (coronary artery disease) s/p LAD stent   ? DNI (do not intubate)   ? Major neurocognitive disorder with Lewy bodies, probable, with behavioral disturbance (H)   ? Suicidal ideations   ? PVC's (premature ventricular contractions)   ? Acute on chronic systolic heart failure (H)   ? Ischemic cardiomyopathy     Revi  e  Physical Examination Review of Systems   w jad  Vitals:    04/16/20 1423   BP: 106/72   Pulse: 76   Resp: 14     Body mass index is 31.84 kg/m .  Wt Readings from Last 3 Encounters:   04/16/20 (!) 248 lb (112.5 kg)   04/03/20 (!) 240 lb (108.9 kg)   04/01/20 (!) 240 lb (108.9 kg)     General Appearance:   Alert, well-appearing and in no acute distress.   HEENT: Atraumatic, normocephalic.  No scleral icterus, normal conjunctivae; mucous membranes pink and moist.     Chest: Chest symmetric, spine straight.   Lungs:   Respirations unlabored: Lungs are clear to auscultation with crackles in bilateral bases.   Cardiovascular:   Normal first and second heart sounds with no murmurs, rubs, or gallops.  Regular, regular.  Radial and posterior tibial pulses are intact.  Normal JVD, no edema.  No signs of cellulitis or gout in right great toe, which is most painful part of right foot.       Extremities: No cyanosis or clubbing   Musculoskeletal: Moves all extremities   Skin: Warm, dry, intact.    Neurologic: Mood and affect are appropriate, alert and oriented to person, place, time, and situation    General: Night Sweats  Eyes: WNL  Ears/Nose/Throat: WNL  Lungs: Shortness of Breath, Snoring  Heart: Chest Pain, Arm Pain, Shortness of Breath with activity, Irregular Heartbeat, Leg Swelling(Palpitations)  Stomach: Diarrhea, Nausea  Bladder: Frequent Urination at Night  Muscle/Joints: Joint Pain, Muscle Weakness, Muscle Pain  Skin: WNL  Nervous System: Daytime Sleepiness, Dizziness, Loss of Balance  Mental Health: Anxiety     Blood: Easy Bleeding, Easy Bruising        Medical History  Surgical History Family History Social History     Past Medical History:   Diagnosis Date   ? Anxiety    ? BPH (benign prostatic hyperplasia)    ? Cancer (H)     Basal Cell  (on face)   ? Chest pain 06/18/2016   ? Chronic pain     back   ? Coronary artery disease     Hx of angio with (2) stents, lad stent   ? DNI (do not intubate)    ? GERD (gastroesophageal reflux disease)    ? Hypertension    ? Kidney stone    ? Lewy body disease (H)    ? Obesity    ? Opioid abuse (H)    ? Panic disorder    ? Pulmonary emboli (H)    ? Trigeminal neuralgia     Past Surgical History:   Procedure Laterality Date   ? APPENDECTOMY     ? BACK SURGERY     ? CARDIAC CATHETERIZATION  11/21/2016    no change, no intervention   ? CARDIAC CATHETERIZATION  12/2013   ? CORONARY ANGIOPLASTY  12/2013    2 stents placed   ? ESOPHAGOGASTRODUODENOSCOPY N/A 10/30/2015    Procedure: ESOPHAGOGASTRODUODENOSCOPY using biopsy forceps;  Surgeon: Hamilton Sanchez MD;  Location: [facility];  Service:    ? MT APPENDECTOMY      Description: Appendectomy;  Recorded: 10/17/2011;   ? MT ARTHRODESIS ANT INTERBODY MIN DISCECTOMY,LUMBAR      Description: Lumbar Vertebral Fusion;  Recorded: 10/17/2011;   ? PROSTATE SURGERY      x2 per pt  for enlarged prostate   ? SKIN BIOPSY Right     face    Family History   Problem Relation Age of Onset   ? Heart attack Mother    ? Other Father         Abdominal Aneurysm   ? Heart attack Brother    ? Heart attack Brother    ? Hypertension Son    ? Hyperlipidemia Son     Social History     Tobacco Use   Smoking Status Never Smoker   Smokeless Tobacco Never Used     Social History     Substance and Sexual Activity   Alcohol Use No    Comment: No alcohol in 34 years (since 1980)        Medications  Allergies     Current Outpatient Medications   Medication Sig Dispense Refill   ? ALPRAZolam (XANAX) 0.5 MG tablet Take 1 tablet (0.5 mg total) by mouth 3 (three) times a day as needed for anxiety. 60 tablet 0   ?  aspirin 81 MG EC tablet Take 1 tablet (81 mg total) by mouth daily.  0   ? atenolol (TENORMIN) 100 MG tablet Take 50 mg by mouth daily.      ? cholecalciferol, vitamin D3, 1,000 unit tablet Take 2,000 Units by mouth daily.     ? escitalopram oxalate (LEXAPRO) 20 MG tablet Take 1.5 tablets (30 mg total) by mouth daily. 135 tablet 2   ? finasteride (PROSCAR) 5 mg tablet Take 5 mg by mouth daily.     ? furosemide (LASIX) 40 MG tablet Take 2 tabs orally in am and 1 tab in afternoon. 180 tablet 1   ? gabapentin (NEURONTIN) 600 MG tablet Take 1,200 mg by mouth at bedtime.            ? gabapentin (NEURONTIN) 600 MG tablet Take 600 mg by mouth every morning.     ? HYDROcodone-acetaminophen (NORCO) 7.5-325 mg per tablet TK 1 T PO Q 4 TO 6 H PRN P . MAX 5 TS IN 24 H  0   ? losartan (COZAAR) 50 MG tablet Take 100 mg by mouth daily.      ? mirtazapine (REMERON) 30 MG tablet Take 1 tablet (30 mg total) by mouth at bedtime. 90 tablet 6   ? nitroglycerin (NITROSTAT) 0.4 MG SL tablet Place 0.4 mg under the tongue every 5 (five) minutes as needed for chest pain.     ? ondansetron (ZOFRAN) 8 MG tablet Take 8 mg by mouth every 8 (eight) hours as needed for nausea.     ? pantoprazole (PROTONIX) 40 MG tablet Take 40 mg by mouth every morning.     ? potassium chloride (K-DUR,KLOR-CON) 20 MEQ tablet Take 1 tablet (20 mEq total) by mouth daily. For now 60 tablet 0   ? QUEtiapine (SEROQUEL) 25 MG tablet Take 1 tablet (25 mg total) by mouth 3 (three) times a day as needed. (Patient taking differently: Take 25 mg by mouth 2 (two) times a day. ) 270 tablet 1   ? QUEtiapine (SEROQUEL) 50 MG tablet Take 1 tablet (50 mg total) by mouth at bedtime. 90 tablet 3   ? rivastigmine (EXELON) 9.5 mg/24 hr Place 1 patch on the skin daily. 90 patch 3   ? tamsulosin (FLOMAX) 0.4 mg Cp24 Take 0.8 mg by mouth every evening.      ? tiZANidine (ZANAFLEX) 4 MG tablet Take 4-8 mg by mouth every 8 (eight) hours as needed (spasms).      ? venlafaxine (EFFEXOR XR)  "37.5 MG 24 hr capsule Take 1 capsule (37.5 mg total) by mouth daily. Take with 75mg cap (112.5mg) (Patient taking differently: Take 37.5 mg by mouth daily. Take 37.5 mg for 5 days      ) 30 capsule 6   ? warfarin (COUMADIN/JANTOVEN) 4 MG tablet Take 4 mg by mouth once.  3     No current facility-administered medications for this visit.       Allergies   Allergen Reactions   ? Atorvastatin Other (See Comments)     Muscle ache   ? Compazine [Prochlorperazine] Other (See Comments)     Agitated, \"crazy\"   ? Galantamine Unknown   ? Penicillins Hives     Reaction was a long time ago   ? Simvastatin Myalgia   ? Sulfa (Sulfonamide Antibiotics) Hives     Reaction was a long time ago   ? Tetracyclines Unknown      Medical, surgical, family, social history, and medications were all reviewed and updated as necessary.   Lab Results    Chemistry/lipid CBC Cardiac Enzymes/BNP/TSH/INR     Lab Results   Component Value Date    CREATININE 1.00 01/23/2020    BUN 14 01/23/2020     01/23/2020    K 3.8 01/23/2020     01/23/2020    CO2 30 01/23/2020     Creatinine (mg/dL)   Date Value   01/23/2020 1.00   02/13/2019 1.10   10/29/2018 0.94   07/11/2018 1.12     Lab Results   Component Value Date    BNP 66 (H) 04/05/2014    Lab Results   Component Value Date    WBC 7.9 10/29/2018    HGB 14.1 10/29/2018    HCT 44.0 10/29/2018    MCV 87 10/29/2018     10/29/2018     Lab Results   Component Value Date    INR 1.91 (H) 11/08/2018      Lab Results   Component Value Date    CHOL 262 (H) 01/23/2020    HDL 33 (L) 01/23/2020    LDLCALC  01/23/2020      Comment:      Invalid, Triglycerides >400    TRIG 510 (H) 01/23/2020                                    "

## 2021-06-29 NOTE — PROGRESS NOTES
"Progress Notes by Chelsey Aguirre CNP at 4/23/2020  9:50 AM     Author: Chelsey Aguirre CNP Service: -- Author Type: Nurse Practitioner    Filed: 4/23/2020 10:17 AM Encounter Date: 4/23/2020 Status: Signed    : Chelsey Aguirre CNP (Nurse Practitioner)       The patient has been notified of following:     \"This telephone visit will be conducted via a call between you and your physician/provider. We have found that certain health care needs can be provided without the need for a physical exam.  This service lets us provide the care you need with a phone conversation.  If a prescription is necessary we can send it directly to your pharmacy.  If lab work is needed we can place an order for that and you can then stop by our lab to have the test done at a later time. If during the course of the call the physician/provider feels a telephone visit is not appropriate, you will not be charged for this service.\"         HEART CARE PHONE ENCOUNTER        Appointment is being conducted as a telephone visit, to reduce risk of exposure given the current status of Coronovirus in our community. This telephone visit is being conducted via a call between the patient and physician/provider. Health care needs are being provided without a physical exam.     Assessment/Recommendations   Assessment & PLAN:     Diagnoses and all orders for this visit:   BARNES  Chronic systolic heart failure (H) and switch from acute to chronic systolic heart failure.  Will decrease diuretic to 40 mg p.o. twice daily.  I expect Dr. Sharp will likely taper this back as I do not think he is in heart failure.  To continue daily weights.  Given weight parameters to call with.    Ischemic cardiomyopathy history.  Coronary artery disease involving native coronary artery of native heart without angina pectoris and chest pain is likely chest wall and not anginal in nature on questioning    Other orders  -     ALPRAZolam (XANAX) 1 MG tablet; " Take 1 mg by mouth 3 (three) times a day.    Follow up in clinic with Dr. Bradley in 1 year and wife requesting to switch to Dr. Bradley as Dr Sanjeev Rodriguez retired.    Total time of call between patient and provider was 20 minutes .  Start time 0932 and end time 0952.     History of Present Illness/Subjective    Cain Charles is a 70 y.o. male who is being evaluated via a billable telephone visit.    Cain Charles has a known history of remote history of anterior MI and stenting of LAD.  He has ischemic cardiomyopathy with mild to moderate cardiomyopathy and now acute on chronic systolic heart failure.  His history is also significant for more frequent PVCs and has Lewy body dementia which was diagnosed 8 years ago and told that he 5-year life expectancy.  He lives with his wife.  She does his medications.  I him having this telephone visit with Parveen and his wife, Carolina to follow-up on heart failure.  I discussed that lab work last week showed that his BNP was normal and electrolytes and kidney function normal as well.  I discussed that I do not believe that his shortness of breath is related to heart failure.  He does not have anginal type symptoms.  Parveen continues to complain of significant dyspnea on exertion.  He is very short of breath with lying down the last 2 nights.  He denies any fever or chills.  He has had occasional dry cough.  He has pain that is worse with cough.  Pain is worse on the right side of his chest and with deep breaths.  He fell more than a month ago.  He was seen by Dr. Sharp at the time.  No x-ray was done.  The pain was pretty significant at the time and then got better and now is worse the last several weeks per his wife Carolina.  He is taking furosemide 80 mg in the morning and 40 mg in the afternoon every day.  His weight has not changed and he is staying between 242 to 247 pounds.  He does not void a lot after diuretics.  He has a history of PE.  His wife Carolina has occasional  dry cough but no fever or chills.  She denies any shortness of breath or body aches.  They live in a household where their son or son-in-law works at [employer].    I have reviewed and updated the patient's Past Medical History, Social History, Family History and Medication List.   Cardiographics (reviewed):  January 2020 24-hour Holter shows sinus rhythm with first-degree AV block.  Average heart rate 61.  Heart rate range 51-1 2 0.  14% of heartbeats were PVCs and frequently seen in bigeminy and trigeminy.       Results for orders placed in visit on 04/01/20   Echo Complete [ECH10] 04/03/2020     Narrative 1. The left ventricle is normal in size. Left ventricular systolic   performance is mildly reduced. The ejection fraction is estimated to be   45%.   2. There is mild global reduction in left ventricular systolic   performance.   3. No significant valvular heart disease is identified on this study.   4. Probable normal right ventricular size and systolic performance though   right-sided structures are not clearly visualized on all views on this   study.      When compared to the prior real-time echocardiogram dated 9 December 2013,   there has been a mild diminution in left ventricular systolic performance.           Results for orders placed during the hospital encounter of 11/21/16   CV Coronary Angio Possible PCI [CATH50] 11/21/2016     Narrative  Procedure Summary   - Chronic chest pain. Large anterior and apical MI in 2013   with subsequent stress tests showing apical infarct and mixed   distal inferior infarct ischemia. Recurrent chest pain with   exertion.   - 4 Fr right radial access   - Mild LAD and diagonal disease with patent stents. Small   ulcerated plaque in proximal LAD unchanged.   - No circumflex disease appreciated   - Mild proximal RCA ulcerated plaque, stable on serial   angiograms. FFR across this lesion was not flow limiting with   adenosine at 0.96.   - EF was depressed  at 33% with distal anterior and apical   akinesis with involvement of the distal inferior wall as well.   - LV EDP elevated at 23mmHg pre-ventriculogram and 30 mmHg   post   - Deep T wave inversions were noted with contrast injections   consistent with microvascular dysfunction              Comments:Chronic mild plaque ulceration. Given stress test positive in    this distribution FFR was done and was not flow limiting.                  Results for orders placed in visit on 11/07/16   NM Pharmacologic Stress Test     Narrative    FINDINGS: The Lexiscan stress images demonstrate severely reduced tracer   uptake in an medium sized area of the inferior and apical wall.  The   resting images demonstrate mildly reduced tracer uptake in a small area of   the apical wall.  There is normal left ventricular   size. The gated images reveal moderately reduced global systolic   performance. The measured left ventricular ejection fraction is 38%.      CONCLUSION:   1. Lexiscan stress nuclear study is abnormal.  2. There is a medium sized severe intensity partially reversible defect in   the inferior and apical wall representing an area of inducible myocardial   ischemia with a small area of infarction in the apical wall.  3. Moderately reduced left ventricular systolic function with an ejection   fraction of 38%,      COMMENTS:      The patient is at intermediate risk of future cardiac ischemic events   based on perfusion imaging.         Last week BNP 10 and sodium 138, potassium 4.2, BUN 11 and creatinine 1.04.       Physical Examination not perform given phone encounter Review of Systems                                                Medical History  Surgical History Family History Social History   Past Medical History:   Diagnosis Date   ? Anxiety    ? BPH (benign prostatic hyperplasia)    ? Cancer (H)     Basal Cell  (on face)   ? Chest pain 06/18/2016   ? Chronic pain     back   ? Coronary artery disease     Hx of angio  with (2) stents, lad stent   ? DNI (do not intubate)    ? GERD (gastroesophageal reflux disease)    ? Hypertension    ? Kidney stone    ? Lewy body disease (H)    ? Obesity    ? Opioid abuse (H)    ? Panic disorder    ? Pulmonary emboli (H)    ? Trigeminal neuralgia     Past Surgical History:   Procedure Laterality Date   ? APPENDECTOMY     ? BACK SURGERY     ? CARDIAC CATHETERIZATION  11/21/2016    no change, no intervention   ? CARDIAC CATHETERIZATION  12/2013   ? CORONARY ANGIOPLASTY  12/2013    2 stents placed   ? ESOPHAGOGASTRODUODENOSCOPY N/A 10/30/2015    Procedure: ESOPHAGOGASTRODUODENOSCOPY using biopsy forceps;  Surgeon: Hamilton Sanchez MD;  Location: [facility];  Service:    ? MA APPENDECTOMY      Description: Appendectomy;  Recorded: 10/17/2011;   ? MA ARTHRODESIS ANT INTERBODY MIN DISCECTOMY,LUMBAR      Description: Lumbar Vertebral Fusion;  Recorded: 10/17/2011;   ? PROSTATE SURGERY      x2 per pt  for enlarged prostate   ? SKIN BIOPSY Right     face    Family History   Problem Relation Age of Onset   ? Heart attack Mother    ? Other Father         Abdominal Aneurysm   ? Heart attack Brother    ? Heart attack Brother    ? Hypertension Son    ? Hyperlipidemia Son     Social History     Socioeconomic History   ? Marital status:      Spouse name: Not on file   ? Number of children: 2   ? Years of education: Not on file   ? Highest education level: Not on file   Occupational History   ? Not on file   Social Needs   ? Financial resource strain: Not on file   ? Food insecurity     Worry: Not on file     Inability: Not on file   ? Transportation needs     Medical: Not on file     Non-medical: Not on file   Tobacco Use   ? Smoking status: Never Smoker   ? Smokeless tobacco: Never Used   Substance and Sexual Activity   ? Alcohol use: No     Comment: No alcohol in 34 years (since 1980)   ? Drug use: No     Comment: medical marijuana.    ? Sexual activity: Not on file   Lifestyle   ? Physical activity      Days per week: Not on file     Minutes per session: Not on file   ? Stress: Not on file   Relationships   ? Social connections     Talks on phone: Not on file     Gets together: Not on file     Attends Sikh service: Not on file     Active member of club or organization: Not on file     Attends meetings of clubs or organizations: Not on file     Relationship status: Not on file   ? Intimate partner violence     Fear of current or ex partner: Not on file     Emotionally abused: Not on file     Physically abused: Not on file     Forced sexual activity: Not on file   Other Topics Concern   ? Not on file   Social History Narrative    Patient is prescribed medical marijuana. Lives with his wife.          Medications  Allergies   Current Outpatient Medications   Medication Sig Dispense Refill   ? ALPRAZolam (XANAX) 1 MG tablet Take 1 mg by mouth 3 (three) times a day.     ? aspirin 81 MG EC tablet Take 1 tablet (81 mg total) by mouth daily.  0   ? atenolol (TENORMIN) 100 MG tablet Take 50 mg by mouth daily.      ? cholecalciferol, vitamin D3, 1,000 unit tablet Take 2,000 Units by mouth daily.     ? escitalopram oxalate (LEXAPRO) 20 MG tablet Take 1.5 tablets (30 mg total) by mouth daily. (Patient taking differently: Take 20 mg by mouth daily. ) 135 tablet 2   ? finasteride (PROSCAR) 5 mg tablet Take 5 mg by mouth daily.     ? furosemide (LASIX) 40 MG tablet Take 2 tabs orally in am and 1 tab in afternoon. 180 tablet 1   ? gabapentin (NEURONTIN) 600 MG tablet Take 1,200 mg by mouth at bedtime.            ? gabapentin (NEURONTIN) 600 MG tablet Take 600 mg by mouth every morning.     ? HYDROcodone-acetaminophen (NORCO) 7.5-325 mg per tablet TK 1 T PO Q 4 TO 6 H PRN P . MAX 5 TS IN 24 H  0   ? losartan (COZAAR) 50 MG tablet Take 100 mg by mouth daily.      ? mirtazapine (REMERON) 30 MG tablet Take 1 tablet (30 mg total) by mouth at bedtime. 90 tablet 6   ? nitroglycerin (NITROSTAT) 0.4 MG SL tablet Place 0.4 mg under  "the tongue every 5 (five) minutes as needed for chest pain.     ? ondansetron (ZOFRAN) 8 MG tablet Take 8 mg by mouth every 8 (eight) hours as needed for nausea.     ? pantoprazole (PROTONIX) 40 MG tablet Take 40 mg by mouth every morning.     ? potassium chloride (K-DUR,KLOR-CON) 20 MEQ tablet Take 1 tablet (20 mEq total) by mouth daily. For now 60 tablet 0   ? QUEtiapine (SEROQUEL) 25 MG tablet Take 1 tablet (25 mg total) by mouth 3 (three) times a day as needed. (Patient taking differently: Take 25 mg by mouth 2 (two) times a day. ) 270 tablet 1   ? QUEtiapine (SEROQUEL) 50 MG tablet Take 1 tablet (50 mg total) by mouth at bedtime. 90 tablet 3   ? rivastigmine (EXELON) 9.5 mg/24 hr Place 1 patch on the skin daily. 90 patch 3   ? tamsulosin (FLOMAX) 0.4 mg Cp24 Take 0.8 mg by mouth every evening.      ? tiZANidine (ZANAFLEX) 4 MG tablet Take 4-8 mg by mouth every 8 (eight) hours as needed (spasms).      ? warfarin (COUMADIN/JANTOVEN) 4 MG tablet Take 4 mg by mouth once.  3   ? ALPRAZolam (XANAX) 0.5 MG tablet Take 1 tablet (0.5 mg total) by mouth 3 (three) times a day as needed for anxiety. 60 tablet 0   ? venlafaxine (EFFEXOR XR) 37.5 MG 24 hr capsule Take 1 capsule (37.5 mg total) by mouth daily. Take with 75mg cap (112.5mg) 30 capsule 6     No current facility-administered medications for this visit.     Allergies   Allergen Reactions   ? Atorvastatin Other (See Comments)     Muscle ache   ? Compazine [Prochlorperazine] Other (See Comments)     Agitated, \"crazy\"   ? Galantamine Unknown   ? Penicillins Hives     Reaction was a long time ago   ? Simvastatin Myalgia   ? Sulfa (Sulfonamide Antibiotics) Hives     Reaction was a long time ago   ? Tetracyclines Unknown         Lab Results    Chemistry/lipid CBC Cardiac Enzymes/BNP/TSH/INR   Lab Results   Component Value Date    CHOL 262 (H) 01/23/2020    HDL 33 (L) 01/23/2020    LDLCALC  01/23/2020      Comment:      Invalid, Triglycerides >400    TRIG 510 (H) " 01/23/2020    CREATININE 1.04 04/16/2020    BUN 11 04/16/2020    K 4.2 04/16/2020     04/16/2020    CL 99 04/16/2020    CO2 29 04/16/2020    Lab Results   Component Value Date    WBC 7.9 10/29/2018    HGB 14.1 10/29/2018    HCT 44.0 10/29/2018    MCV 87 10/29/2018     10/29/2018    Lab Results   Component Value Date    CKTOTAL 47 10/02/2014    CKMB <1 01/08/2014    TROPONINI <0.01 10/29/2018    BNP 10 04/16/2020    TSH 0.27 (L) 10/29/2018    INR 1.91 (H) 11/08/2018        Chelsey Aguirre

## 2021-07-03 NOTE — ADDENDUM NOTE
Addendum Note by Gissell Muniz CMA at 6/12/2019 11:59 PM     Author: Gissell Muniz CMA Service: -- Author Type: Certified Medical Assistant    Filed: 6/13/2019 11:44 AM Date of Service: 6/12/2019 11:59 PM Status: Signed    : Gissell Muniz CMA (Certified Medical Assistant)    Encounter addended by: Gissell Muniz CMA on: 6/13/2019 11:44 AM      Actions taken: Order list changed, Outside historical medications   filed, Home Medications modified, Medication taking status modified, Order   Reconciliation Section accessed, Medication List reviewed

## 2021-07-03 NOTE — ADDENDUM NOTE
Addendum Note by Gissell Muniz CMA at 8/30/2017  3:48 PM     Author: Gissell Muniz CMA Service: -- Author Type: Certified Medical Assistant    Filed: 8/30/2017  3:48 PM Date of Service: 8/30/2017  3:48 PM Status: Signed    : Gissell Muniz CMA (Certified Medical Assistant)    Encounter addended by: Gissell Muniz CMA on: 8/30/2017  3:48 PM<BR>     Actions taken: Charge Capture section accepted

## 2021-07-03 NOTE — ADDENDUM NOTE
Addendum Note by Thu Smith RN at 1/5/2018 11:59 PM     Author: Thu Smith RN Service: -- Author Type: Registered Nurse    Filed: 1/10/2018  9:27 AM Date of Service: 1/5/2018 11:59 PM Status: Signed    : Thu Smith RN (Registered Nurse)    Encounter addended by: Thu Smith RN on: 1/10/2018  9:27 AM<BR>     Actions taken: Visit diagnoses modified, Charge Capture section accepted

## 2021-07-03 NOTE — ADDENDUM NOTE
Addendum Note by Gissell Muniz CMA at 11/23/2018 11:59 PM     Author: iGssell Muniz CMA Service: -- Author Type: Certified Medical Assistant    Filed: 11/27/2018 10:59 AM Date of Service: 11/23/2018 11:59 PM Status: Signed    : Gissell Muniz CMA (Certified Medical Assistant)    Encounter addended by: Gissell Muniz CMA on: 11/27/2018 10:59 AM      Actions taken: Charge Capture section accepted

## 2021-07-03 NOTE — ADDENDUM NOTE
Addendum Note by Gissell Muniz CMA at 6/15/2018 11:59 PM     Author: Gissell Muniz CMA Service: -- Author Type: Certified Medical Assistant    Filed: 6/18/2018 12:23 PM Date of Service: 6/15/2018 11:59 PM Status: Signed    : Gissell Muniz CMA (Certified Medical Assistant)    Encounter addended by: Gissell Muniz CMA on: 6/18/2018 12:23 PM<BR>     Actions taken: Charge Capture section accepted

## 2021-08-28 ENCOUNTER — HEALTH MAINTENANCE LETTER (OUTPATIENT)
Age: 71
End: 2021-08-28

## 2021-10-23 ENCOUNTER — HEALTH MAINTENANCE LETTER (OUTPATIENT)
Age: 71
End: 2021-10-23

## 2022-01-13 ENCOUNTER — LAB REQUISITION (OUTPATIENT)
Dept: LAB | Facility: CLINIC | Age: 72
End: 2022-01-13
Payer: MEDICARE

## 2022-01-13 DIAGNOSIS — Z03.818 ENCOUNTER FOR OBSERVATION FOR SUSPECTED EXPOSURE TO OTHER BIOLOGICAL AGENTS RULED OUT: ICD-10-CM

## 2022-01-13 PROCEDURE — U0005 INFEC AGEN DETEC AMPLI PROBE: HCPCS | Mod: ORL | Performed by: FAMILY MEDICINE

## 2022-01-14 LAB — SARS-COV-2 RNA RESP QL NAA+PROBE: NEGATIVE

## 2022-01-24 ENCOUNTER — NURSE TRIAGE (OUTPATIENT)
Dept: NURSING | Facility: CLINIC | Age: 72
End: 2022-01-24
Payer: MEDICARE

## 2022-01-24 NOTE — TELEPHONE ENCOUNTER
Patient is calling for covid lab result.     Coronavirus (COVID-19) Notification    Lab Result   Lab test 2019-nCoV rRt-PCR OR SARS-COV-2 PCR    Nasopharyngeal AND/OR Oropharyngeal swab is NEGATIVE for 2019-nCoV RNA [OR] SARS-COV-2 RNA (COVID-19) RNA    Your result was negative. This means that we didn't find the virus that causes COVID-19 in your sample. A test may show negative when you do actually have the virus. This can happen when the virus is in the early stages of infection, before you feel illness symptoms.    If you have symptoms   Stay home and away from others (self-isolate) until you meet ALL of the guidelines below:    You've had no fever--and no medicine that reduces fever--for 1 full day (24 hours). And      Your other symptoms have gotten better. For example, your cough or breathing has improved. And   ; At least 10 days have passed since your symptoms started. (If you've been told by a doctor that you have a weak immune system, wait 20 days.)         During this time:    Stay home. Don't go to work, school or anywhere else.     Stay in your own room, including for meals. Use your own bathroom if you can.    Stay away from others in your home. No hugging, kissing or shaking hands. No visitors.    Clean  high touch  surfaces often (doorknobs, counters, handles, etc.). Use a household cleaning spray or wipes. You can find a full list on the EPA website at www.epa.gov/pesticide-registration/list-n-disinfectants-use-against-sars-cov-2.    Cover your mouth and nose with a mask, tissue or other face covering to avoid spreading germs.    Wash your hands and face often with soap and water.    Going back to work  Check with your employer for any guidelines to follow for going back to work.  You are sent a letter for your Employer which will serve as formal document notice that you, the employee, tested negative for COVID-19, as of the testing date shown above.    If your symptoms worsen or other concerning  symptoms, contact PCP, oncare or consider returning to Emergency Dept.    Where can I get more information?    [facility]: [email or web address]    Coronavirus Basics: www.[facility]./diseases/coronavirus/basics.html    [facility] Hotline (665-965-1361)    Cydney Alvarenga RN    COVID 19 Nurse Triage Plan/Patient Instructions    Please be aware that novel coronavirus (COVID-19) may be circulating in the community. If you develop symptoms such as fever, cough, or SOB or if you have concerns about the presence of another infection including coronavirus (COVID-19), please contact your health care provider or visit [email or web address].     Disposition/Instructions    Home care recommended. Follow home care protocol based instructions.    Thank you for taking steps to prevent the spread of this virus.  o Limit your contact with others.  o Wear a simple mask to cover your cough.  o Wash your hands well and often.    Resources    [facility]: About COVID-19: [email or web address]    CDC: What to Do If You're Sick: www.cdc.gov/coronavirus/2019-ncov/about/steps-when-sick.html    CDC: Ending Home Isolation: www.cdc.gov/coronavirus/2019-ncov/hcp/disposition-in-home-patients.html     CDC: Caring for Someone: www.cdc.gov/coronavirus/2019-ncov/if-you-are-sick/care-for-someone.html     [facility]: Interim Guidance for Hospital Discharge to Home: www.[facility]./diseases/coronavirus/hcp/hospdischarge.pdf    [facility] clinical trials (COVID-19 research studies): clinicalaffairs.[facility]/n-clinical-trials     Below are the COVID-19 hotlines at the [place] [facility] ([facility]). Interpreters are available.   o For health questions: Call 401-656-3109 or 1-287.153.7399 (7 a.m. to 7 p.m.)  o For questions about schools and childcare: Call 285-089-3020 or 1-133.349.7548 (7 a.m. to 7 p.m.)

## 2022-03-24 NOTE — PROGRESS NOTES
ROS: Easy bruising and bleeding, leg swelling, chest pain, fall, muscle pain and weakness, loss of balance, depression and anxiety.    DISPLAY PLAN FREE TEXT

## 2022-06-16 ENCOUNTER — LAB REQUISITION (OUTPATIENT)
Dept: LAB | Facility: CLINIC | Age: 72
End: 2022-06-16
Payer: MEDICARE

## 2022-06-16 DIAGNOSIS — I11.0 HYPERTENSIVE HEART DISEASE WITH HEART FAILURE (H): ICD-10-CM

## 2022-06-16 DIAGNOSIS — E78.5 HYPERLIPIDEMIA, UNSPECIFIED: ICD-10-CM

## 2022-06-16 LAB
ANION GAP SERPL CALCULATED.3IONS-SCNC: 9 MMOL/L (ref 5–18)
BUN SERPL-MCNC: 9 MG/DL (ref 8–28)
CALCIUM SERPL-MCNC: 8.6 MG/DL (ref 8.5–10.5)
CHLORIDE BLD-SCNC: 103 MMOL/L (ref 98–107)
CHOLEST SERPL-MCNC: 152 MG/DL
CO2 SERPL-SCNC: 29 MMOL/L (ref 22–31)
CREAT SERPL-MCNC: 0.93 MG/DL (ref 0.7–1.3)
GFR SERPL CREATININE-BSD FRML MDRD: 87 ML/MIN/1.73M2
GLUCOSE BLD-MCNC: 122 MG/DL (ref 70–125)
HDLC SERPL-MCNC: 34 MG/DL
LDLC SERPL CALC-MCNC: 79 MG/DL
POTASSIUM BLD-SCNC: 3.9 MMOL/L (ref 3.5–5)
SODIUM SERPL-SCNC: 141 MMOL/L (ref 136–145)
TRIGL SERPL-MCNC: 194 MG/DL

## 2022-06-16 PROCEDURE — 80048 BASIC METABOLIC PNL TOTAL CA: CPT | Mod: ORL | Performed by: FAMILY MEDICINE

## 2022-06-16 PROCEDURE — 80061 LIPID PANEL: CPT | Mod: ORL | Performed by: FAMILY MEDICINE

## 2023-04-18 ENCOUNTER — LAB REQUISITION (OUTPATIENT)
Dept: LAB | Facility: CLINIC | Age: 73
End: 2023-04-18
Payer: MEDICARE

## 2023-04-18 DIAGNOSIS — I11.0 HYPERTENSIVE HEART DISEASE WITH HEART FAILURE (H): ICD-10-CM

## 2023-04-18 DIAGNOSIS — E78.5 HYPERLIPIDEMIA, UNSPECIFIED: ICD-10-CM

## 2023-04-18 LAB
ANION GAP SERPL CALCULATED.3IONS-SCNC: 14 MMOL/L (ref 7–15)
BUN SERPL-MCNC: 11.1 MG/DL (ref 8–23)
CALCIUM SERPL-MCNC: 8.8 MG/DL (ref 8.8–10.2)
CHLORIDE SERPL-SCNC: 100 MMOL/L (ref 98–107)
CHOLEST SERPL-MCNC: 169 MG/DL
CREAT SERPL-MCNC: 1.17 MG/DL (ref 0.67–1.17)
DEPRECATED HCO3 PLAS-SCNC: 28 MMOL/L (ref 22–29)
GFR SERPL CREATININE-BSD FRML MDRD: 66 ML/MIN/1.73M2
GLUCOSE SERPL-MCNC: 113 MG/DL (ref 70–99)
HDLC SERPL-MCNC: 34 MG/DL
LDLC SERPL CALC-MCNC: 84 MG/DL
NONHDLC SERPL-MCNC: 135 MG/DL
POTASSIUM SERPL-SCNC: 3.7 MMOL/L (ref 3.4–5.3)
SODIUM SERPL-SCNC: 142 MMOL/L (ref 136–145)
TRIGL SERPL-MCNC: 253 MG/DL

## 2023-04-18 PROCEDURE — 80061 LIPID PANEL: CPT | Mod: ORL | Performed by: FAMILY MEDICINE

## 2023-04-18 PROCEDURE — 80048 BASIC METABOLIC PNL TOTAL CA: CPT | Mod: ORL | Performed by: FAMILY MEDICINE

## 2023-05-25 ENCOUNTER — LAB REQUISITION (OUTPATIENT)
Dept: LAB | Facility: CLINIC | Age: 73
End: 2023-05-25
Payer: MEDICARE

## 2023-05-25 DIAGNOSIS — Z01.818 ENCOUNTER FOR OTHER PREPROCEDURAL EXAMINATION: ICD-10-CM

## 2023-05-25 LAB
ANION GAP SERPL CALCULATED.3IONS-SCNC: 13 MMOL/L (ref 7–15)
BUN SERPL-MCNC: 12.2 MG/DL (ref 8–23)
CALCIUM SERPL-MCNC: 8.8 MG/DL (ref 8.8–10.2)
CHLORIDE SERPL-SCNC: 101 MMOL/L (ref 98–107)
CREAT SERPL-MCNC: 1.05 MG/DL (ref 0.67–1.17)
DEPRECATED HCO3 PLAS-SCNC: 28 MMOL/L (ref 22–29)
GFR SERPL CREATININE-BSD FRML MDRD: 75 ML/MIN/1.73M2
GLUCOSE SERPL-MCNC: 138 MG/DL (ref 70–99)
POTASSIUM SERPL-SCNC: 3.2 MMOL/L (ref 3.4–5.3)
SODIUM SERPL-SCNC: 142 MMOL/L (ref 136–145)

## 2023-05-25 PROCEDURE — 80048 BASIC METABOLIC PNL TOTAL CA: CPT | Mod: ORL | Performed by: FAMILY MEDICINE

## 2023-10-19 ENCOUNTER — LAB REQUISITION (OUTPATIENT)
Dept: LAB | Facility: CLINIC | Age: 73
End: 2023-10-19
Payer: MEDICARE

## 2023-10-19 DIAGNOSIS — E87.6 HYPOKALEMIA: ICD-10-CM

## 2023-10-19 LAB
ANION GAP SERPL CALCULATED.3IONS-SCNC: 14 MMOL/L (ref 7–15)
BUN SERPL-MCNC: 8.8 MG/DL (ref 8–23)
CALCIUM SERPL-MCNC: 8.6 MG/DL (ref 8.8–10.2)
CHLORIDE SERPL-SCNC: 98 MMOL/L (ref 98–107)
CREAT SERPL-MCNC: 1.11 MG/DL (ref 0.67–1.17)
DEPRECATED HCO3 PLAS-SCNC: 27 MMOL/L (ref 22–29)
EGFRCR SERPLBLD CKD-EPI 2021: 70 ML/MIN/1.73M2
GLUCOSE SERPL-MCNC: 109 MG/DL (ref 70–99)
POTASSIUM SERPL-SCNC: 3.4 MMOL/L (ref 3.4–5.3)
SODIUM SERPL-SCNC: 139 MMOL/L (ref 135–145)

## 2023-10-19 PROCEDURE — 80048 BASIC METABOLIC PNL TOTAL CA: CPT | Mod: ORL | Performed by: FAMILY MEDICINE

## 2024-04-02 ENCOUNTER — LAB REQUISITION (OUTPATIENT)
Dept: LAB | Facility: CLINIC | Age: 74
End: 2024-04-02
Payer: MEDICARE

## 2024-04-02 DIAGNOSIS — R10.32 LEFT LOWER QUADRANT PAIN: ICD-10-CM

## 2024-04-02 LAB
ERYTHROCYTE [DISTWIDTH] IN BLOOD BY AUTOMATED COUNT: 14 % (ref 10–15)
HCT VFR BLD AUTO: 40.8 % (ref 40–53)
HGB BLD-MCNC: 13.6 G/DL (ref 13.3–17.7)
MCH RBC QN AUTO: 28.8 PG (ref 26.5–33)
MCHC RBC AUTO-ENTMCNC: 33.3 G/DL (ref 31.5–36.5)
MCV RBC AUTO: 86 FL (ref 78–100)
PLATELET # BLD AUTO: 272 10E3/UL (ref 150–450)
RBC # BLD AUTO: 4.72 10E6/UL (ref 4.4–5.9)
WBC # BLD AUTO: 7.9 10E3/UL (ref 4–11)

## 2024-04-02 PROCEDURE — 85027 COMPLETE CBC AUTOMATED: CPT | Mod: ORL | Performed by: FAMILY MEDICINE

## 2024-04-02 PROCEDURE — 83690 ASSAY OF LIPASE: CPT | Mod: ORL | Performed by: FAMILY MEDICINE

## 2024-04-02 PROCEDURE — 80053 COMPREHEN METABOLIC PANEL: CPT | Mod: ORL | Performed by: FAMILY MEDICINE

## 2024-04-03 LAB
ALBUMIN SERPL BCG-MCNC: 3.8 G/DL (ref 3.5–5.2)
ALP SERPL-CCNC: 90 U/L (ref 40–150)
ALT SERPL W P-5'-P-CCNC: 10 U/L (ref 0–70)
ANION GAP SERPL CALCULATED.3IONS-SCNC: 10 MMOL/L (ref 7–15)
AST SERPL W P-5'-P-CCNC: 24 U/L (ref 0–45)
BILIRUB SERPL-MCNC: 0.3 MG/DL
BUN SERPL-MCNC: 12.8 MG/DL (ref 8–23)
CALCIUM SERPL-MCNC: 8.8 MG/DL (ref 8.8–10.2)
CHLORIDE SERPL-SCNC: 97 MMOL/L (ref 98–107)
CREAT SERPL-MCNC: 1.28 MG/DL (ref 0.67–1.17)
DEPRECATED HCO3 PLAS-SCNC: 30 MMOL/L (ref 22–29)
EGFRCR SERPLBLD CKD-EPI 2021: 59 ML/MIN/1.73M2
GLUCOSE SERPL-MCNC: 124 MG/DL (ref 70–99)
LIPASE SERPL-CCNC: 31 U/L (ref 13–60)
POTASSIUM SERPL-SCNC: 3.7 MMOL/L (ref 3.4–5.3)
PROT SERPL-MCNC: 7.3 G/DL (ref 6.4–8.3)
SODIUM SERPL-SCNC: 137 MMOL/L (ref 135–145)

## 2024-04-11 ENCOUNTER — LAB REQUISITION (OUTPATIENT)
Dept: LAB | Facility: CLINIC | Age: 74
End: 2024-04-11
Payer: MEDICARE

## 2024-04-11 DIAGNOSIS — E78.5 HYPERLIPIDEMIA, UNSPECIFIED: ICD-10-CM

## 2024-04-11 PROCEDURE — 80061 LIPID PANEL: CPT | Mod: ORL | Performed by: FAMILY MEDICINE

## 2024-04-12 LAB
CHOLEST SERPL-MCNC: 176 MG/DL
FASTING STATUS PATIENT QL REPORTED: ABNORMAL
HDLC SERPL-MCNC: 34 MG/DL
LDLC SERPL CALC-MCNC: 82 MG/DL
NONHDLC SERPL-MCNC: 142 MG/DL
TRIGL SERPL-MCNC: 298 MG/DL

## 2024-10-01 ENCOUNTER — LAB REQUISITION (OUTPATIENT)
Dept: LAB | Facility: CLINIC | Age: 74
End: 2024-10-01
Payer: COMMERCIAL

## 2024-10-01 DIAGNOSIS — R19.7 DIARRHEA, UNSPECIFIED: ICD-10-CM

## 2024-10-01 PROCEDURE — 87102 FUNGUS ISOLATION CULTURE: CPT | Mod: ORL | Performed by: FAMILY MEDICINE

## 2024-10-01 PROCEDURE — 87106 FUNGI IDENTIFICATION YEAST: CPT | Mod: ORL | Performed by: FAMILY MEDICINE

## 2024-10-01 PROCEDURE — 87507 IADNA-DNA/RNA PROBE TQ 12-25: CPT | Mod: ORL | Performed by: FAMILY MEDICINE

## 2024-10-02 LAB

## 2024-10-04 LAB — BACTERIA STL CULT: ABNORMAL

## 2025-07-15 ENCOUNTER — LAB REQUISITION (OUTPATIENT)
Dept: LAB | Facility: CLINIC | Age: 75
End: 2025-07-15
Payer: MEDICARE

## 2025-07-15 DIAGNOSIS — R07.9 CHEST PAIN, UNSPECIFIED: ICD-10-CM

## 2025-07-15 DIAGNOSIS — Z12.5 ENCOUNTER FOR SCREENING FOR MALIGNANT NEOPLASM OF PROSTATE: ICD-10-CM

## 2025-07-15 DIAGNOSIS — R53.83 OTHER FATIGUE: ICD-10-CM

## 2025-07-15 PROCEDURE — 83880 ASSAY OF NATRIURETIC PEPTIDE: CPT | Mod: ORL | Performed by: FAMILY MEDICINE

## 2025-07-15 PROCEDURE — 84443 ASSAY THYROID STIM HORMONE: CPT | Mod: ORL | Performed by: FAMILY MEDICINE

## 2025-07-15 PROCEDURE — 83550 IRON BINDING TEST: CPT | Mod: ORL | Performed by: FAMILY MEDICINE

## 2025-07-15 PROCEDURE — 80053 COMPREHEN METABOLIC PANEL: CPT | Mod: ORL | Performed by: FAMILY MEDICINE

## 2025-07-15 PROCEDURE — 82728 ASSAY OF FERRITIN: CPT | Mod: ORL | Performed by: FAMILY MEDICINE

## 2025-07-15 PROCEDURE — G0103 PSA SCREENING: HCPCS | Mod: ORL | Performed by: FAMILY MEDICINE

## 2025-07-15 PROCEDURE — 82607 VITAMIN B-12: CPT | Mod: ORL | Performed by: FAMILY MEDICINE

## 2025-07-15 PROCEDURE — 84484 ASSAY OF TROPONIN QUANT: CPT | Mod: ORL | Performed by: FAMILY MEDICINE

## 2025-07-16 LAB
ALBUMIN SERPL BCG-MCNC: 3.7 G/DL (ref 3.5–5.2)
ALP SERPL-CCNC: 80 U/L (ref 40–150)
ALT SERPL W P-5'-P-CCNC: 9 U/L (ref 0–70)
ANION GAP SERPL CALCULATED.3IONS-SCNC: 8 MMOL/L (ref 7–15)
AST SERPL W P-5'-P-CCNC: 23 U/L (ref 0–45)
BILIRUB SERPL-MCNC: 0.2 MG/DL
BUN SERPL-MCNC: 11.8 MG/DL (ref 8–23)
CALCIUM SERPL-MCNC: 8.7 MG/DL (ref 8.8–10.4)
CHLORIDE SERPL-SCNC: 103 MMOL/L (ref 98–107)
CREAT SERPL-MCNC: 0.92 MG/DL (ref 0.67–1.17)
EGFRCR SERPLBLD CKD-EPI 2021: 87 ML/MIN/1.73M2
FERRITIN SERPL-MCNC: 103 NG/ML (ref 31–409)
GLUCOSE SERPL-MCNC: 111 MG/DL (ref 70–99)
HCO3 SERPL-SCNC: 30 MMOL/L (ref 22–29)
IRON BINDING CAPACITY (ROCHE): 249 UG/DL (ref 240–430)
IRON SATN MFR SERPL: 20 % (ref 15–46)
IRON SERPL-MCNC: 50 UG/DL (ref 61–157)
NT-PROBNP SERPL-MCNC: 383 PG/ML (ref 0–852)
POTASSIUM SERPL-SCNC: 3.6 MMOL/L (ref 3.4–5.3)
PROT SERPL-MCNC: 6.4 G/DL (ref 6.4–8.3)
PSA SERPL DL<=0.01 NG/ML-MCNC: 0.52 NG/ML (ref 0–6.5)
SODIUM SERPL-SCNC: 141 MMOL/L (ref 135–145)
TROPONIN T SERPL HS-MCNC: 11 NG/L
TSH SERPL DL<=0.005 MIU/L-ACNC: 1.78 UIU/ML (ref 0.3–4.2)
VIT B12 SERPL-MCNC: 257 PG/ML (ref 232–1245)